# Patient Record
Sex: FEMALE | Race: WHITE | NOT HISPANIC OR LATINO | URBAN - METROPOLITAN AREA
[De-identification: names, ages, dates, MRNs, and addresses within clinical notes are randomized per-mention and may not be internally consistent; named-entity substitution may affect disease eponyms.]

---

## 2017-08-28 ENCOUNTER — OUTPATIENT (OUTPATIENT)
Dept: OUTPATIENT SERVICES | Facility: HOSPITAL | Age: 72
LOS: 1 days | Discharge: HOME | End: 2017-08-28

## 2017-08-31 DIAGNOSIS — Z13.820 ENCOUNTER FOR SCREENING FOR OSTEOPOROSIS: ICD-10-CM

## 2017-08-31 DIAGNOSIS — M89.9 DISORDER OF BONE, UNSPECIFIED: ICD-10-CM

## 2017-08-31 DIAGNOSIS — Z78.0 ASYMPTOMATIC MENOPAUSAL STATE: ICD-10-CM

## 2018-02-20 ENCOUNTER — OUTPATIENT (OUTPATIENT)
Dept: OUTPATIENT SERVICES | Facility: HOSPITAL | Age: 73
LOS: 1 days | Discharge: HOME | End: 2018-02-20

## 2018-02-20 DIAGNOSIS — J18.9 PNEUMONIA, UNSPECIFIED ORGANISM: ICD-10-CM

## 2018-04-23 PROBLEM — Z00.00 ENCOUNTER FOR PREVENTIVE HEALTH EXAMINATION: Status: ACTIVE | Noted: 2018-04-23

## 2018-04-26 ENCOUNTER — APPOINTMENT (OUTPATIENT)
Dept: OTOLARYNGOLOGY | Facility: CLINIC | Age: 73
End: 2018-04-26
Payer: MEDICARE

## 2018-04-26 VITALS — BODY MASS INDEX: 29.56 KG/M2 | HEIGHT: 57 IN | WEIGHT: 137 LBS

## 2018-04-26 DIAGNOSIS — Z83.79 FAMILY HISTORY OF OTHER DISEASES OF THE DIGESTIVE SYSTEM: ICD-10-CM

## 2018-04-26 DIAGNOSIS — Z87.891 PERSONAL HISTORY OF NICOTINE DEPENDENCE: ICD-10-CM

## 2018-04-26 DIAGNOSIS — H61.21 IMPACTED CERUMEN, RIGHT EAR: ICD-10-CM

## 2018-04-26 DIAGNOSIS — I72.9 ANEURYSM OF UNSPECIFIED SITE: ICD-10-CM

## 2018-04-26 DIAGNOSIS — C25.9 MALIGNANT NEOPLASM OF PANCREAS, UNSPECIFIED: ICD-10-CM

## 2018-04-26 DIAGNOSIS — M81.0 AGE-RELATED OSTEOPOROSIS W/OUT CURRENT PATHOLOGICAL FRACTURE: ICD-10-CM

## 2018-04-26 PROCEDURE — 99203 OFFICE O/P NEW LOW 30 MIN: CPT | Mod: 25

## 2018-04-26 PROCEDURE — 69210 REMOVE IMPACTED EAR WAX UNI: CPT

## 2018-04-26 RX ORDER — LISINOPRIL 30 MG/1
TABLET ORAL
Refills: 0 | Status: ACTIVE | COMMUNITY

## 2018-04-26 RX ORDER — RALOXIFENE HYDROCHLORIDE 60 MG/1
TABLET ORAL
Refills: 0 | Status: ACTIVE | COMMUNITY

## 2018-05-07 ENCOUNTER — LABORATORY RESULT (OUTPATIENT)
Age: 73
End: 2018-05-07

## 2018-05-07 ENCOUNTER — OUTPATIENT (OUTPATIENT)
Dept: OUTPATIENT SERVICES | Facility: HOSPITAL | Age: 73
LOS: 1 days | Discharge: HOME | End: 2018-05-07

## 2018-05-07 DIAGNOSIS — R22.1 LOCALIZED SWELLING, MASS AND LUMP, NECK: ICD-10-CM

## 2018-05-15 ENCOUNTER — OUTPATIENT (OUTPATIENT)
Dept: OUTPATIENT SERVICES | Facility: HOSPITAL | Age: 73
LOS: 1 days | Discharge: HOME | End: 2018-05-15

## 2018-05-15 DIAGNOSIS — R22.1 LOCALIZED SWELLING, MASS AND LUMP, NECK: ICD-10-CM

## 2018-05-31 ENCOUNTER — APPOINTMENT (OUTPATIENT)
Dept: OTOLARYNGOLOGY | Facility: CLINIC | Age: 73
End: 2018-05-31
Payer: MEDICARE

## 2018-05-31 VITALS
HEIGHT: 57 IN | DIASTOLIC BLOOD PRESSURE: 75 MMHG | SYSTOLIC BLOOD PRESSURE: 128 MMHG | WEIGHT: 137 LBS | BODY MASS INDEX: 29.56 KG/M2

## 2018-05-31 DIAGNOSIS — R22.1 LOCALIZED SWELLING, MASS AND LUMP, NECK: ICD-10-CM

## 2018-05-31 PROCEDURE — 99213 OFFICE O/P EST LOW 20 MIN: CPT

## 2018-07-18 ENCOUNTER — OUTPATIENT (OUTPATIENT)
Dept: OUTPATIENT SERVICES | Facility: HOSPITAL | Age: 73
LOS: 1 days | Discharge: HOME | End: 2018-07-18

## 2018-07-18 DIAGNOSIS — Z12.31 ENCOUNTER FOR SCREENING MAMMOGRAM FOR MALIGNANT NEOPLASM OF BREAST: ICD-10-CM

## 2019-08-06 ENCOUNTER — OUTPATIENT (OUTPATIENT)
Dept: OUTPATIENT SERVICES | Facility: HOSPITAL | Age: 74
LOS: 1 days | Discharge: HOME | End: 2019-08-06

## 2019-08-06 DIAGNOSIS — Z00.00 ENCOUNTER FOR GENERAL ADULT MEDICAL EXAMINATION WITHOUT ABNORMAL FINDINGS: ICD-10-CM

## 2019-08-06 DIAGNOSIS — R53.81 OTHER MALAISE: ICD-10-CM

## 2019-09-23 ENCOUNTER — OUTPATIENT (OUTPATIENT)
Dept: OUTPATIENT SERVICES | Facility: HOSPITAL | Age: 74
LOS: 1 days | Discharge: HOME | End: 2019-09-23

## 2019-09-24 DIAGNOSIS — Z09 ENCOUNTER FOR FOLLOW-UP EXAMINATION AFTER COMPLETED TREATMENT FOR CONDITIONS OTHER THAN MALIGNANT NEOPLASM: ICD-10-CM

## 2019-09-24 DIAGNOSIS — M81.0 AGE-RELATED OSTEOPOROSIS WITHOUT CURRENT PATHOLOGICAL FRACTURE: ICD-10-CM

## 2019-09-24 DIAGNOSIS — Z78.0 ASYMPTOMATIC MENOPAUSAL STATE: ICD-10-CM

## 2019-10-16 ENCOUNTER — OUTPATIENT (OUTPATIENT)
Dept: OUTPATIENT SERVICES | Facility: HOSPITAL | Age: 74
LOS: 1 days | Discharge: HOME | End: 2019-10-16
Payer: MEDICARE

## 2019-10-16 DIAGNOSIS — Z12.31 ENCOUNTER FOR SCREENING MAMMOGRAM FOR MALIGNANT NEOPLASM OF BREAST: ICD-10-CM

## 2019-10-16 PROCEDURE — 77067 SCR MAMMO BI INCL CAD: CPT | Mod: 26

## 2019-10-16 PROCEDURE — 77063 BREAST TOMOSYNTHESIS BI: CPT | Mod: 26

## 2019-10-22 ENCOUNTER — OUTPATIENT (OUTPATIENT)
Dept: OUTPATIENT SERVICES | Facility: HOSPITAL | Age: 74
LOS: 1 days | Discharge: HOME | End: 2019-10-22

## 2019-10-22 DIAGNOSIS — E55.9 VITAMIN D DEFICIENCY, UNSPECIFIED: ICD-10-CM

## 2019-10-22 DIAGNOSIS — M81.0 AGE-RELATED OSTEOPOROSIS WITHOUT CURRENT PATHOLOGICAL FRACTURE: ICD-10-CM

## 2019-11-01 ENCOUNTER — OUTPATIENT (OUTPATIENT)
Dept: OUTPATIENT SERVICES | Facility: HOSPITAL | Age: 74
LOS: 1 days | Discharge: HOME | End: 2019-11-01
Payer: MEDICARE

## 2019-11-01 DIAGNOSIS — R92.8 OTHER ABNORMAL AND INCONCLUSIVE FINDINGS ON DIAGNOSTIC IMAGING OF BREAST: ICD-10-CM

## 2019-11-01 PROCEDURE — 76642 ULTRASOUND BREAST LIMITED: CPT | Mod: 26,RT

## 2019-11-01 PROCEDURE — G0279: CPT | Mod: 26,RT

## 2019-11-01 PROCEDURE — 77065 DX MAMMO INCL CAD UNI: CPT | Mod: 26,RT

## 2019-11-12 ENCOUNTER — OUTPATIENT (OUTPATIENT)
Dept: OUTPATIENT SERVICES | Facility: HOSPITAL | Age: 74
LOS: 1 days | Discharge: HOME | End: 2019-11-12
Payer: MEDICARE

## 2019-11-12 ENCOUNTER — RESULT REVIEW (OUTPATIENT)
Age: 74
End: 2019-11-12

## 2019-11-12 PROCEDURE — 88305 TISSUE EXAM BY PATHOLOGIST: CPT | Mod: 26

## 2019-11-12 PROCEDURE — 19083 BX BREAST 1ST LESION US IMAG: CPT | Mod: RT

## 2019-11-12 PROCEDURE — 77065 DX MAMMO INCL CAD UNI: CPT | Mod: 26,RT

## 2019-11-13 LAB — SURGICAL PATHOLOGY STUDY: SIGNIFICANT CHANGE UP

## 2019-11-18 DIAGNOSIS — N64.2 ATROPHY OF BREAST: ICD-10-CM

## 2019-11-18 DIAGNOSIS — N63.10 UNSPECIFIED LUMP IN THE RIGHT BREAST, UNSPECIFIED QUADRANT: ICD-10-CM

## 2019-11-18 DIAGNOSIS — N64.1 FAT NECROSIS OF BREAST: ICD-10-CM

## 2019-12-07 ENCOUNTER — OUTPATIENT (OUTPATIENT)
Dept: OUTPATIENT SERVICES | Facility: HOSPITAL | Age: 74
LOS: 1 days | Discharge: HOME | End: 2019-12-07
Payer: MEDICARE

## 2019-12-07 DIAGNOSIS — I71.3 ABDOMINAL AORTIC ANEURYSM, RUPTURED: ICD-10-CM

## 2019-12-07 PROCEDURE — 76775 US EXAM ABDO BACK WALL LIM: CPT | Mod: 26

## 2021-01-01 ENCOUNTER — INPATIENT (INPATIENT)
Facility: HOSPITAL | Age: 76
LOS: 11 days | End: 2021-01-24
Attending: INTERNAL MEDICINE | Admitting: INTERNAL MEDICINE
Payer: MEDICARE

## 2021-01-01 ENCOUNTER — EMERGENCY (EMERGENCY)
Facility: HOSPITAL | Age: 76
LOS: 0 days | Discharge: HOME | End: 2021-01-06
Attending: EMERGENCY MEDICINE | Admitting: EMERGENCY MEDICINE
Payer: MEDICARE

## 2021-01-01 VITALS
DIASTOLIC BLOOD PRESSURE: 75 MMHG | HEART RATE: 82 BPM | SYSTOLIC BLOOD PRESSURE: 135 MMHG | RESPIRATION RATE: 18 BRPM | OXYGEN SATURATION: 95 % | TEMPERATURE: 100 F

## 2021-01-01 VITALS
DIASTOLIC BLOOD PRESSURE: 64 MMHG | SYSTOLIC BLOOD PRESSURE: 111 MMHG | HEART RATE: 78 BPM | TEMPERATURE: 100 F | RESPIRATION RATE: 18 BRPM

## 2021-01-01 VITALS
DIASTOLIC BLOOD PRESSURE: 84 MMHG | TEMPERATURE: 100 F | HEART RATE: 84 BPM | OXYGEN SATURATION: 96 % | SYSTOLIC BLOOD PRESSURE: 180 MMHG | RESPIRATION RATE: 18 BRPM

## 2021-01-01 VITALS — RESPIRATION RATE: 34 BRPM

## 2021-01-01 DIAGNOSIS — Z87.891 PERSONAL HISTORY OF NICOTINE DEPENDENCE: ICD-10-CM

## 2021-01-01 DIAGNOSIS — U07.1 COVID-19: ICD-10-CM

## 2021-01-01 DIAGNOSIS — R51.9 HEADACHE, UNSPECIFIED: ICD-10-CM

## 2021-01-01 DIAGNOSIS — I10 ESSENTIAL (PRIMARY) HYPERTENSION: ICD-10-CM

## 2021-01-01 LAB
ALBUMIN SERPL ELPH-MCNC: 2 G/DL — LOW (ref 3.5–5.2)
ALBUMIN SERPL ELPH-MCNC: 2.2 G/DL — LOW (ref 3.5–5.2)
ALBUMIN SERPL ELPH-MCNC: 2.4 G/DL — LOW (ref 3.5–5.2)
ALBUMIN SERPL ELPH-MCNC: 2.6 G/DL — LOW (ref 3.5–5.2)
ALBUMIN SERPL ELPH-MCNC: 2.7 G/DL — LOW (ref 3.5–5.2)
ALBUMIN SERPL ELPH-MCNC: 2.9 G/DL — LOW (ref 3.5–5.2)
ALBUMIN SERPL ELPH-MCNC: 3 G/DL — LOW (ref 3.5–5.2)
ALBUMIN SERPL ELPH-MCNC: 3.3 G/DL — LOW (ref 3.5–5.2)
ALBUMIN SERPL ELPH-MCNC: 3.5 G/DL — SIGNIFICANT CHANGE UP (ref 3.5–5.2)
ALBUMIN SERPL ELPH-MCNC: 3.5 G/DL — SIGNIFICANT CHANGE UP (ref 3.5–5.2)
ALBUMIN SERPL ELPH-MCNC: 3.8 G/DL — SIGNIFICANT CHANGE UP (ref 3.5–5.2)
ALP SERPL-CCNC: 101 U/L — SIGNIFICANT CHANGE UP (ref 30–115)
ALP SERPL-CCNC: 108 U/L — SIGNIFICANT CHANGE UP (ref 30–115)
ALP SERPL-CCNC: 122 U/L — HIGH (ref 30–115)
ALP SERPL-CCNC: 137 U/L — HIGH (ref 30–115)
ALP SERPL-CCNC: 55 U/L — SIGNIFICANT CHANGE UP (ref 30–115)
ALP SERPL-CCNC: 55 U/L — SIGNIFICANT CHANGE UP (ref 30–115)
ALP SERPL-CCNC: 62 U/L — SIGNIFICANT CHANGE UP (ref 30–115)
ALP SERPL-CCNC: 68 U/L — SIGNIFICANT CHANGE UP (ref 30–115)
ALP SERPL-CCNC: 68 U/L — SIGNIFICANT CHANGE UP (ref 30–115)
ALP SERPL-CCNC: 70 U/L — SIGNIFICANT CHANGE UP (ref 30–115)
ALP SERPL-CCNC: 72 U/L — SIGNIFICANT CHANGE UP (ref 30–115)
ALP SERPL-CCNC: 80 U/L — SIGNIFICANT CHANGE UP (ref 30–115)
ALP SERPL-CCNC: 89 U/L — SIGNIFICANT CHANGE UP (ref 30–115)
ALT FLD-CCNC: 12 U/L — SIGNIFICANT CHANGE UP (ref 0–41)
ALT FLD-CCNC: 13 U/L — SIGNIFICANT CHANGE UP (ref 0–41)
ALT FLD-CCNC: 14 U/L — SIGNIFICANT CHANGE UP (ref 0–41)
ALT FLD-CCNC: 16 U/L — SIGNIFICANT CHANGE UP (ref 0–41)
ALT FLD-CCNC: 17 U/L — SIGNIFICANT CHANGE UP (ref 0–41)
ALT FLD-CCNC: 19 U/L — SIGNIFICANT CHANGE UP (ref 0–41)
ALT FLD-CCNC: 19 U/L — SIGNIFICANT CHANGE UP (ref 0–41)
ALT FLD-CCNC: 20 U/L — SIGNIFICANT CHANGE UP (ref 0–41)
ALT FLD-CCNC: 241 U/L — HIGH (ref 0–41)
ALT FLD-CCNC: 27 U/L — SIGNIFICANT CHANGE UP (ref 0–41)
ALT FLD-CCNC: 53 U/L — HIGH (ref 0–41)
ALT FLD-CCNC: 78 U/L — HIGH (ref 0–41)
ALT FLD-CCNC: 84 U/L — HIGH (ref 0–41)
ANION GAP SERPL CALC-SCNC: 10 MMOL/L — SIGNIFICANT CHANGE UP (ref 7–14)
ANION GAP SERPL CALC-SCNC: 10 MMOL/L — SIGNIFICANT CHANGE UP (ref 7–14)
ANION GAP SERPL CALC-SCNC: 11 MMOL/L — SIGNIFICANT CHANGE UP (ref 7–14)
ANION GAP SERPL CALC-SCNC: 11 MMOL/L — SIGNIFICANT CHANGE UP (ref 7–14)
ANION GAP SERPL CALC-SCNC: 12 MMOL/L — SIGNIFICANT CHANGE UP (ref 7–14)
ANION GAP SERPL CALC-SCNC: 13 MMOL/L — SIGNIFICANT CHANGE UP (ref 7–14)
ANION GAP SERPL CALC-SCNC: 15 MMOL/L — HIGH (ref 7–14)
ANION GAP SERPL CALC-SCNC: 8 MMOL/L — SIGNIFICANT CHANGE UP (ref 7–14)
ANION GAP SERPL CALC-SCNC: 9 MMOL/L — SIGNIFICANT CHANGE UP (ref 7–14)
ANION GAP SERPL CALC-SCNC: 9 MMOL/L — SIGNIFICANT CHANGE UP (ref 7–14)
APPEARANCE UR: CLEAR — SIGNIFICANT CHANGE UP
AST SERPL-CCNC: 157 U/L — HIGH (ref 0–41)
AST SERPL-CCNC: 25 U/L — SIGNIFICANT CHANGE UP (ref 0–41)
AST SERPL-CCNC: 28 U/L — SIGNIFICANT CHANGE UP (ref 0–41)
AST SERPL-CCNC: 32 U/L — SIGNIFICANT CHANGE UP (ref 0–41)
AST SERPL-CCNC: 35 U/L — SIGNIFICANT CHANGE UP (ref 0–41)
AST SERPL-CCNC: 35 U/L — SIGNIFICANT CHANGE UP (ref 0–41)
AST SERPL-CCNC: 38 U/L — SIGNIFICANT CHANGE UP (ref 0–41)
AST SERPL-CCNC: 39 U/L — SIGNIFICANT CHANGE UP (ref 0–41)
AST SERPL-CCNC: 40 U/L — SIGNIFICANT CHANGE UP (ref 0–41)
AST SERPL-CCNC: 40 U/L — SIGNIFICANT CHANGE UP (ref 0–41)
AST SERPL-CCNC: 42 U/L — HIGH (ref 0–41)
AST SERPL-CCNC: 42 U/L — HIGH (ref 0–41)
AST SERPL-CCNC: 52 U/L — HIGH (ref 0–41)
BACTERIA # UR AUTO: NEGATIVE — SIGNIFICANT CHANGE UP
BASE EXCESS BLDA CALC-SCNC: -1.1 MMOL/L — SIGNIFICANT CHANGE UP (ref -2–2)
BASE EXCESS BLDA CALC-SCNC: -3.5 MMOL/L — LOW (ref -2–2)
BASE EXCESS BLDA CALC-SCNC: -6.3 MMOL/L — LOW (ref -2–2)
BASE EXCESS BLDA CALC-SCNC: -6.4 MMOL/L — LOW (ref -2–2)
BASE EXCESS BLDV CALC-SCNC: -2.9 MMOL/L — LOW (ref -2–2)
BASOPHILS # BLD AUTO: 0.01 K/UL — SIGNIFICANT CHANGE UP (ref 0–0.2)
BASOPHILS # BLD AUTO: 0.02 K/UL — SIGNIFICANT CHANGE UP (ref 0–0.2)
BASOPHILS # BLD AUTO: 0.03 K/UL — SIGNIFICANT CHANGE UP (ref 0–0.2)
BASOPHILS # BLD AUTO: 0.1 K/UL — SIGNIFICANT CHANGE UP (ref 0–0.2)
BASOPHILS NFR BLD AUTO: 0.1 % — SIGNIFICANT CHANGE UP (ref 0–1)
BASOPHILS NFR BLD AUTO: 0.2 % — SIGNIFICANT CHANGE UP (ref 0–1)
BASOPHILS NFR BLD AUTO: 0.3 % — SIGNIFICANT CHANGE UP (ref 0–1)
BASOPHILS NFR BLD AUTO: 0.5 % — SIGNIFICANT CHANGE UP (ref 0–1)
BILIRUB SERPL-MCNC: 0.2 MG/DL — SIGNIFICANT CHANGE UP (ref 0.2–1.2)
BILIRUB SERPL-MCNC: 0.2 MG/DL — SIGNIFICANT CHANGE UP (ref 0.2–1.2)
BILIRUB SERPL-MCNC: 0.3 MG/DL — SIGNIFICANT CHANGE UP (ref 0.2–1.2)
BILIRUB SERPL-MCNC: 0.4 MG/DL — SIGNIFICANT CHANGE UP (ref 0.2–1.2)
BILIRUB SERPL-MCNC: <0.2 MG/DL — SIGNIFICANT CHANGE UP (ref 0.2–1.2)
BILIRUB UR-MCNC: NEGATIVE — SIGNIFICANT CHANGE UP
BUN SERPL-MCNC: 119 MG/DL — CRITICAL HIGH (ref 10–20)
BUN SERPL-MCNC: 24 MG/DL — HIGH (ref 10–20)
BUN SERPL-MCNC: 34 MG/DL — HIGH (ref 10–20)
BUN SERPL-MCNC: 35 MG/DL — HIGH (ref 10–20)
BUN SERPL-MCNC: 39 MG/DL — HIGH (ref 10–20)
BUN SERPL-MCNC: 39 MG/DL — HIGH (ref 10–20)
BUN SERPL-MCNC: 43 MG/DL — HIGH (ref 10–20)
BUN SERPL-MCNC: 49 MG/DL — HIGH (ref 10–20)
BUN SERPL-MCNC: 50 MG/DL — HIGH (ref 10–20)
BUN SERPL-MCNC: 62 MG/DL — CRITICAL HIGH (ref 10–20)
BUN SERPL-MCNC: 67 MG/DL — CRITICAL HIGH (ref 10–20)
BUN SERPL-MCNC: 72 MG/DL — CRITICAL HIGH (ref 10–20)
BUN SERPL-MCNC: 81 MG/DL — CRITICAL HIGH (ref 10–20)
BUN SERPL-MCNC: 86 MG/DL — CRITICAL HIGH (ref 10–20)
BUN SERPL-MCNC: 95 MG/DL — CRITICAL HIGH (ref 10–20)
CA-I SERPL-SCNC: 1.22 MMOL/L — SIGNIFICANT CHANGE UP (ref 1.12–1.3)
CALCIUM SERPL-MCNC: 8.1 MG/DL — LOW (ref 8.5–10.1)
CALCIUM SERPL-MCNC: 8.4 MG/DL — LOW (ref 8.5–10.1)
CALCIUM SERPL-MCNC: 8.5 MG/DL — SIGNIFICANT CHANGE UP (ref 8.5–10.1)
CALCIUM SERPL-MCNC: 8.7 MG/DL — SIGNIFICANT CHANGE UP (ref 8.5–10.1)
CALCIUM SERPL-MCNC: 8.8 MG/DL — SIGNIFICANT CHANGE UP (ref 8.5–10.1)
CALCIUM SERPL-MCNC: 8.9 MG/DL — SIGNIFICANT CHANGE UP (ref 8.5–10.1)
CALCIUM SERPL-MCNC: 8.9 MG/DL — SIGNIFICANT CHANGE UP (ref 8.5–10.1)
CALCIUM SERPL-MCNC: 9 MG/DL — SIGNIFICANT CHANGE UP (ref 8.5–10.1)
CALCIUM SERPL-MCNC: 9.1 MG/DL — SIGNIFICANT CHANGE UP (ref 8.5–10.1)
CALCIUM SERPL-MCNC: 9.2 MG/DL — SIGNIFICANT CHANGE UP (ref 8.5–10.1)
CALCIUM SERPL-MCNC: 9.2 MG/DL — SIGNIFICANT CHANGE UP (ref 8.5–10.1)
CHLORIDE SERPL-SCNC: 103 MMOL/L — SIGNIFICANT CHANGE UP (ref 98–110)
CHLORIDE SERPL-SCNC: 104 MMOL/L — SIGNIFICANT CHANGE UP (ref 98–110)
CHLORIDE SERPL-SCNC: 105 MMOL/L — SIGNIFICANT CHANGE UP (ref 98–110)
CHLORIDE SERPL-SCNC: 107 MMOL/L — SIGNIFICANT CHANGE UP (ref 98–110)
CHLORIDE SERPL-SCNC: 108 MMOL/L — SIGNIFICANT CHANGE UP (ref 98–110)
CHLORIDE SERPL-SCNC: 110 MMOL/L — SIGNIFICANT CHANGE UP (ref 98–110)
CHLORIDE SERPL-SCNC: 111 MMOL/L — HIGH (ref 98–110)
CHLORIDE SERPL-SCNC: 113 MMOL/L — HIGH (ref 98–110)
CHLORIDE SERPL-SCNC: 113 MMOL/L — HIGH (ref 98–110)
CHLORIDE SERPL-SCNC: 114 MMOL/L — HIGH (ref 98–110)
CHLORIDE SERPL-SCNC: 118 MMOL/L — HIGH (ref 98–110)
CHLORIDE SERPL-SCNC: 95 MMOL/L — LOW (ref 98–110)
CHLORIDE SERPL-SCNC: 99 MMOL/L — SIGNIFICANT CHANGE UP (ref 98–110)
CO2 SERPL-SCNC: 15 MMOL/L — LOW (ref 17–32)
CO2 SERPL-SCNC: 19 MMOL/L — SIGNIFICANT CHANGE UP (ref 17–32)
CO2 SERPL-SCNC: 21 MMOL/L — SIGNIFICANT CHANGE UP (ref 17–32)
CO2 SERPL-SCNC: 22 MMOL/L — SIGNIFICANT CHANGE UP (ref 17–32)
CO2 SERPL-SCNC: 22 MMOL/L — SIGNIFICANT CHANGE UP (ref 17–32)
CO2 SERPL-SCNC: 23 MMOL/L — SIGNIFICANT CHANGE UP (ref 17–32)
CO2 SERPL-SCNC: 23 MMOL/L — SIGNIFICANT CHANGE UP (ref 17–32)
CO2 SERPL-SCNC: 24 MMOL/L — SIGNIFICANT CHANGE UP (ref 17–32)
CO2 SERPL-SCNC: 28 MMOL/L — SIGNIFICANT CHANGE UP (ref 17–32)
COLOR SPEC: YELLOW — SIGNIFICANT CHANGE UP
CREAT ?TM UR-MCNC: 127 MG/DL — SIGNIFICANT CHANGE UP
CREAT SERPL-MCNC: 1 MG/DL — SIGNIFICANT CHANGE UP (ref 0.7–1.5)
CREAT SERPL-MCNC: 1.1 MG/DL — SIGNIFICANT CHANGE UP (ref 0.7–1.5)
CREAT SERPL-MCNC: 1.2 MG/DL — SIGNIFICANT CHANGE UP (ref 0.7–1.5)
CREAT SERPL-MCNC: 1.3 MG/DL — SIGNIFICANT CHANGE UP (ref 0.7–1.5)
CREAT SERPL-MCNC: 1.3 MG/DL — SIGNIFICANT CHANGE UP (ref 0.7–1.5)
CREAT SERPL-MCNC: 1.5 MG/DL — SIGNIFICANT CHANGE UP (ref 0.7–1.5)
CREAT SERPL-MCNC: 1.5 MG/DL — SIGNIFICANT CHANGE UP (ref 0.7–1.5)
CREAT SERPL-MCNC: 1.8 MG/DL — HIGH (ref 0.7–1.5)
CREAT SERPL-MCNC: 1.9 MG/DL — HIGH (ref 0.7–1.5)
CREAT SERPL-MCNC: 2 MG/DL — HIGH (ref 0.7–1.5)
CREAT SERPL-MCNC: 2.5 MG/DL — HIGH (ref 0.7–1.5)
CRP SERPL-MCNC: 5.03 MG/DL — HIGH (ref 0–0.4)
CRP SERPL-MCNC: 5.82 MG/DL — HIGH (ref 0–0.4)
CRP SERPL-MCNC: 8.34 MG/DL — HIGH (ref 0–0.4)
CRP SERPL-MCNC: 8.42 MG/DL — HIGH (ref 0–0.4)
CRP SERPL-MCNC: 9.21 MG/DL — HIGH (ref 0–0.4)
CRP SERPL-MCNC: 9.75 MG/DL — HIGH (ref 0–0.4)
CULTURE RESULTS: SIGNIFICANT CHANGE UP
CULTURE RESULTS: SIGNIFICANT CHANGE UP
D DIMER BLD IA.RAPID-MCNC: 102 NG/ML DDU — SIGNIFICANT CHANGE UP (ref 0–230)
D DIMER BLD IA.RAPID-MCNC: 274 NG/ML DDU — HIGH (ref 0–230)
D DIMER BLD IA.RAPID-MCNC: 285 NG/ML DDU — HIGH (ref 0–230)
D DIMER BLD IA.RAPID-MCNC: 501 NG/ML DDU — HIGH (ref 0–230)
D DIMER BLD IA.RAPID-MCNC: 564 NG/ML DDU — HIGH (ref 0–230)
D DIMER BLD IA.RAPID-MCNC: 574 NG/ML DDU — HIGH (ref 0–230)
D DIMER BLD IA.RAPID-MCNC: 787 NG/ML DDU — HIGH (ref 0–230)
DIFF PNL FLD: NEGATIVE — SIGNIFICANT CHANGE UP
EOSINOPHIL # BLD AUTO: 0 K/UL — SIGNIFICANT CHANGE UP (ref 0–0.7)
EOSINOPHIL # BLD AUTO: 0.01 K/UL — SIGNIFICANT CHANGE UP (ref 0–0.7)
EOSINOPHIL # BLD AUTO: 0.02 K/UL — SIGNIFICANT CHANGE UP (ref 0–0.7)
EOSINOPHIL NFR BLD AUTO: 0 % — SIGNIFICANT CHANGE UP (ref 0–8)
EOSINOPHIL NFR BLD AUTO: 0.1 % — SIGNIFICANT CHANGE UP (ref 0–8)
EOSINOPHIL NFR BLD AUTO: 0.2 % — SIGNIFICANT CHANGE UP (ref 0–8)
EOSINOPHIL NFR BLD AUTO: 0.2 % — SIGNIFICANT CHANGE UP (ref 0–8)
EPI CELLS # UR: 4 /HPF — SIGNIFICANT CHANGE UP (ref 0–5)
FERRITIN SERPL-MCNC: 836 NG/ML — HIGH (ref 15–150)
FERRITIN SERPL-MCNC: 852 NG/ML — HIGH (ref 15–150)
FERRITIN SERPL-MCNC: 879 NG/ML — HIGH (ref 15–150)
FERRITIN SERPL-MCNC: 984 NG/ML — HIGH (ref 15–150)
GAS PNL BLDA: SIGNIFICANT CHANGE UP
GAS PNL BLDV: 140 MMOL/L — SIGNIFICANT CHANGE UP (ref 136–145)
GAS PNL BLDV: SIGNIFICANT CHANGE UP
GLUCOSE BLDC GLUCOMTR-MCNC: 115 MG/DL — HIGH (ref 70–99)
GLUCOSE BLDC GLUCOMTR-MCNC: 162 MG/DL — HIGH (ref 70–99)
GLUCOSE BLDC GLUCOMTR-MCNC: 163 MG/DL — HIGH (ref 70–99)
GLUCOSE BLDC GLUCOMTR-MCNC: 173 MG/DL — HIGH (ref 70–99)
GLUCOSE BLDC GLUCOMTR-MCNC: 175 MG/DL — HIGH (ref 70–99)
GLUCOSE BLDC GLUCOMTR-MCNC: 182 MG/DL — HIGH (ref 70–99)
GLUCOSE BLDC GLUCOMTR-MCNC: 202 MG/DL — HIGH (ref 70–99)
GLUCOSE BLDC GLUCOMTR-MCNC: 207 MG/DL — HIGH (ref 70–99)
GLUCOSE BLDC GLUCOMTR-MCNC: 210 MG/DL — HIGH (ref 70–99)
GLUCOSE BLDC GLUCOMTR-MCNC: 216 MG/DL — HIGH (ref 70–99)
GLUCOSE BLDC GLUCOMTR-MCNC: 219 MG/DL — HIGH (ref 70–99)
GLUCOSE SERPL-MCNC: 109 MG/DL — HIGH (ref 70–99)
GLUCOSE SERPL-MCNC: 109 MG/DL — HIGH (ref 70–99)
GLUCOSE SERPL-MCNC: 111 MG/DL — HIGH (ref 70–99)
GLUCOSE SERPL-MCNC: 125 MG/DL — HIGH (ref 70–99)
GLUCOSE SERPL-MCNC: 127 MG/DL — HIGH (ref 70–99)
GLUCOSE SERPL-MCNC: 152 MG/DL — HIGH (ref 70–99)
GLUCOSE SERPL-MCNC: 158 MG/DL — HIGH (ref 70–99)
GLUCOSE SERPL-MCNC: 164 MG/DL — HIGH (ref 70–99)
GLUCOSE SERPL-MCNC: 164 MG/DL — HIGH (ref 70–99)
GLUCOSE SERPL-MCNC: 168 MG/DL — HIGH (ref 70–99)
GLUCOSE SERPL-MCNC: 186 MG/DL — HIGH (ref 70–99)
GLUCOSE SERPL-MCNC: 203 MG/DL — HIGH (ref 70–99)
GLUCOSE SERPL-MCNC: 212 MG/DL — HIGH (ref 70–99)
GLUCOSE SERPL-MCNC: 213 MG/DL — HIGH (ref 70–99)
GLUCOSE SERPL-MCNC: 271 MG/DL — HIGH (ref 70–99)
GLUCOSE UR QL: NEGATIVE — SIGNIFICANT CHANGE UP
HCO3 BLDA-SCNC: 19 MMOL/L — LOW (ref 23–27)
HCO3 BLDA-SCNC: 20 MMOL/L — LOW (ref 23–27)
HCO3 BLDA-SCNC: 21 MMOL/L — LOW (ref 23–27)
HCO3 BLDA-SCNC: 25 MMOL/L — SIGNIFICANT CHANGE UP (ref 21–29)
HCO3 BLDV-SCNC: 22 MMOL/L — SIGNIFICANT CHANGE UP (ref 22–29)
HCT VFR BLD CALC: 29.7 % — LOW (ref 37–47)
HCT VFR BLD CALC: 31.4 % — LOW (ref 37–47)
HCT VFR BLD CALC: 32 % — LOW (ref 37–47)
HCT VFR BLD CALC: 32.1 % — LOW (ref 37–47)
HCT VFR BLD CALC: 32.1 % — LOW (ref 37–47)
HCT VFR BLD CALC: 32.4 % — LOW (ref 37–47)
HCT VFR BLD CALC: 32.5 % — LOW (ref 37–47)
HCT VFR BLD CALC: 33 % — LOW (ref 37–47)
HCT VFR BLD CALC: 33.6 % — LOW (ref 37–47)
HCT VFR BLD CALC: 37.1 % — SIGNIFICANT CHANGE UP (ref 37–47)
HCT VFR BLD CALC: 37.8 % — SIGNIFICANT CHANGE UP (ref 37–47)
HCT VFR BLD CALC: 38.6 % — SIGNIFICANT CHANGE UP (ref 37–47)
HCT VFR BLD CALC: 40.5 % — SIGNIFICANT CHANGE UP (ref 37–47)
HCT VFR BLDA CALC: 39.1 % — SIGNIFICANT CHANGE UP (ref 34–44)
HCV AB S/CO SERPL IA: 0.03 COI — SIGNIFICANT CHANGE UP
HCV AB SERPL-IMP: SIGNIFICANT CHANGE UP
HGB BLD CALC-MCNC: 12.8 G/DL — LOW (ref 14–18)
HGB BLD-MCNC: 10 G/DL — LOW (ref 12–16)
HGB BLD-MCNC: 10.2 G/DL — LOW (ref 12–16)
HGB BLD-MCNC: 10.2 G/DL — LOW (ref 12–16)
HGB BLD-MCNC: 10.4 G/DL — LOW (ref 12–16)
HGB BLD-MCNC: 10.6 G/DL — LOW (ref 12–16)
HGB BLD-MCNC: 10.9 G/DL — LOW (ref 12–16)
HGB BLD-MCNC: 12.1 G/DL — SIGNIFICANT CHANGE UP (ref 12–16)
HGB BLD-MCNC: 12.6 G/DL — SIGNIFICANT CHANGE UP (ref 12–16)
HGB BLD-MCNC: 12.7 G/DL — SIGNIFICANT CHANGE UP (ref 12–16)
HGB BLD-MCNC: 13.1 G/DL — SIGNIFICANT CHANGE UP (ref 12–16)
HGB BLD-MCNC: 9.6 G/DL — LOW (ref 12–16)
HOROWITZ INDEX BLDA+IHG-RTO: 100 — SIGNIFICANT CHANGE UP
HOROWITZ INDEX BLDA+IHG-RTO: 100 — SIGNIFICANT CHANGE UP
HYALINE CASTS # UR AUTO: 6 /LPF — SIGNIFICANT CHANGE UP (ref 0–7)
IMM GRANULOCYTES NFR BLD AUTO: 0.3 % — SIGNIFICANT CHANGE UP (ref 0.1–0.3)
IMM GRANULOCYTES NFR BLD AUTO: 0.6 % — HIGH (ref 0.1–0.3)
IMM GRANULOCYTES NFR BLD AUTO: 0.7 % — HIGH (ref 0.1–0.3)
IMM GRANULOCYTES NFR BLD AUTO: 0.7 % — HIGH (ref 0.1–0.3)
IMM GRANULOCYTES NFR BLD AUTO: 0.8 % — HIGH (ref 0.1–0.3)
IMM GRANULOCYTES NFR BLD AUTO: 0.8 % — HIGH (ref 0.1–0.3)
IMM GRANULOCYTES NFR BLD AUTO: 1 % — HIGH (ref 0.1–0.3)
IMM GRANULOCYTES NFR BLD AUTO: 1.7 % — HIGH (ref 0.1–0.3)
IMM GRANULOCYTES NFR BLD AUTO: 1.8 % — HIGH (ref 0.1–0.3)
IMM GRANULOCYTES NFR BLD AUTO: 8.3 % — HIGH (ref 0.1–0.3)
KETONES UR-MCNC: NEGATIVE — SIGNIFICANT CHANGE UP
LACTATE BLDV-MCNC: 1.5 MMOL/L — SIGNIFICANT CHANGE UP (ref 0.5–1.6)
LEGIONELLA AG UR QL: NEGATIVE — SIGNIFICANT CHANGE UP
LEUKOCYTE ESTERASE UR-ACNC: ABNORMAL
LYMPHOCYTES # BLD AUTO: 0.53 K/UL — LOW (ref 1.2–3.4)
LYMPHOCYTES # BLD AUTO: 0.54 K/UL — LOW (ref 1.2–3.4)
LYMPHOCYTES # BLD AUTO: 0.6 K/UL — LOW (ref 1.2–3.4)
LYMPHOCYTES # BLD AUTO: 0.62 K/UL — LOW (ref 1.2–3.4)
LYMPHOCYTES # BLD AUTO: 0.66 K/UL — LOW (ref 1.2–3.4)
LYMPHOCYTES # BLD AUTO: 0.72 K/UL — LOW (ref 1.2–3.4)
LYMPHOCYTES # BLD AUTO: 0.84 K/UL — LOW (ref 1.2–3.4)
LYMPHOCYTES # BLD AUTO: 0.96 K/UL — LOW (ref 1.2–3.4)
LYMPHOCYTES # BLD AUTO: 0.97 K/UL — LOW (ref 1.2–3.4)
LYMPHOCYTES # BLD AUTO: 1 K/UL — LOW (ref 1.2–3.4)
LYMPHOCYTES # BLD AUTO: 16.4 % — LOW (ref 20.5–51.1)
LYMPHOCYTES # BLD AUTO: 19.5 % — LOW (ref 20.5–51.1)
LYMPHOCYTES # BLD AUTO: 4.1 % — LOW (ref 20.5–51.1)
LYMPHOCYTES # BLD AUTO: 4.4 % — LOW (ref 20.5–51.1)
LYMPHOCYTES # BLD AUTO: 4.9 % — LOW (ref 20.5–51.1)
LYMPHOCYTES # BLD AUTO: 5.1 % — LOW (ref 20.5–51.1)
LYMPHOCYTES # BLD AUTO: 5.6 % — LOW (ref 20.5–51.1)
LYMPHOCYTES # BLD AUTO: 6.4 % — LOW (ref 20.5–51.1)
LYMPHOCYTES # BLD AUTO: 6.4 % — LOW (ref 20.5–51.1)
LYMPHOCYTES # BLD AUTO: 8.8 % — LOW (ref 20.5–51.1)
MAGNESIUM SERPL-MCNC: 2 MG/DL — SIGNIFICANT CHANGE UP (ref 1.8–2.4)
MAGNESIUM SERPL-MCNC: 2.3 MG/DL — SIGNIFICANT CHANGE UP (ref 1.8–2.4)
MAGNESIUM SERPL-MCNC: 2.3 MG/DL — SIGNIFICANT CHANGE UP (ref 1.8–2.4)
MAGNESIUM SERPL-MCNC: 2.5 MG/DL — HIGH (ref 1.8–2.4)
MAGNESIUM SERPL-MCNC: 2.6 MG/DL — HIGH (ref 1.8–2.4)
MAGNESIUM SERPL-MCNC: 2.6 MG/DL — HIGH (ref 1.8–2.4)
MAGNESIUM SERPL-MCNC: 2.7 MG/DL — HIGH (ref 1.8–2.4)
MAGNESIUM SERPL-MCNC: 2.7 MG/DL — HIGH (ref 1.8–2.4)
MAGNESIUM SERPL-MCNC: 2.9 MG/DL — HIGH (ref 1.8–2.4)
MCHC RBC-ENTMCNC: 30.9 PG — SIGNIFICANT CHANGE UP (ref 27–31)
MCHC RBC-ENTMCNC: 31.2 G/DL — LOW (ref 32–37)
MCHC RBC-ENTMCNC: 31.2 PG — HIGH (ref 27–31)
MCHC RBC-ENTMCNC: 31.3 G/DL — LOW (ref 32–37)
MCHC RBC-ENTMCNC: 31.3 PG — HIGH (ref 27–31)
MCHC RBC-ENTMCNC: 31.4 G/DL — LOW (ref 32–37)
MCHC RBC-ENTMCNC: 31.4 PG — HIGH (ref 27–31)
MCHC RBC-ENTMCNC: 31.5 PG — HIGH (ref 27–31)
MCHC RBC-ENTMCNC: 31.5 PG — HIGH (ref 27–31)
MCHC RBC-ENTMCNC: 31.6 PG — HIGH (ref 27–31)
MCHC RBC-ENTMCNC: 31.6 PG — HIGH (ref 27–31)
MCHC RBC-ENTMCNC: 31.7 PG — HIGH (ref 27–31)
MCHC RBC-ENTMCNC: 31.8 G/DL — LOW (ref 32–37)
MCHC RBC-ENTMCNC: 31.8 G/DL — LOW (ref 32–37)
MCHC RBC-ENTMCNC: 32 PG — HIGH (ref 27–31)
MCHC RBC-ENTMCNC: 32.1 G/DL — SIGNIFICANT CHANGE UP (ref 32–37)
MCHC RBC-ENTMCNC: 32.1 G/DL — SIGNIFICANT CHANGE UP (ref 32–37)
MCHC RBC-ENTMCNC: 32.1 PG — HIGH (ref 27–31)
MCHC RBC-ENTMCNC: 32.3 G/DL — SIGNIFICANT CHANGE UP (ref 32–37)
MCHC RBC-ENTMCNC: 32.3 G/DL — SIGNIFICANT CHANGE UP (ref 32–37)
MCHC RBC-ENTMCNC: 32.4 G/DL — SIGNIFICANT CHANGE UP (ref 32–37)
MCHC RBC-ENTMCNC: 32.6 G/DL — SIGNIFICANT CHANGE UP (ref 32–37)
MCHC RBC-ENTMCNC: 32.6 G/DL — SIGNIFICANT CHANGE UP (ref 32–37)
MCHC RBC-ENTMCNC: 33.6 G/DL — SIGNIFICANT CHANGE UP (ref 32–37)
MCV RBC AUTO: 100 FL — HIGH (ref 81–99)
MCV RBC AUTO: 100.3 FL — HIGH (ref 81–99)
MCV RBC AUTO: 95.2 FL — SIGNIFICANT CHANGE UP (ref 81–99)
MCV RBC AUTO: 96.7 FL — SIGNIFICANT CHANGE UP (ref 81–99)
MCV RBC AUTO: 97.1 FL — SIGNIFICANT CHANGE UP (ref 81–99)
MCV RBC AUTO: 97.3 FL — SIGNIFICANT CHANGE UP (ref 81–99)
MCV RBC AUTO: 98.1 FL — SIGNIFICANT CHANGE UP (ref 81–99)
MCV RBC AUTO: 98.5 FL — SIGNIFICANT CHANGE UP (ref 81–99)
MCV RBC AUTO: 98.8 FL — SIGNIFICANT CHANGE UP (ref 81–99)
MCV RBC AUTO: 98.8 FL — SIGNIFICANT CHANGE UP (ref 81–99)
MCV RBC AUTO: 99.1 FL — HIGH (ref 81–99)
MCV RBC AUTO: 99.3 FL — HIGH (ref 81–99)
MCV RBC AUTO: 99.4 FL — HIGH (ref 81–99)
MONOCYTES # BLD AUTO: 0.29 K/UL — SIGNIFICANT CHANGE UP (ref 0.1–0.6)
MONOCYTES # BLD AUTO: 0.41 K/UL — SIGNIFICANT CHANGE UP (ref 0.1–0.6)
MONOCYTES # BLD AUTO: 0.49 K/UL — SIGNIFICANT CHANGE UP (ref 0.1–0.6)
MONOCYTES # BLD AUTO: 0.58 K/UL — SIGNIFICANT CHANGE UP (ref 0.1–0.6)
MONOCYTES # BLD AUTO: 0.65 K/UL — HIGH (ref 0.1–0.6)
MONOCYTES # BLD AUTO: 0.65 K/UL — HIGH (ref 0.1–0.6)
MONOCYTES # BLD AUTO: 0.69 K/UL — HIGH (ref 0.1–0.6)
MONOCYTES # BLD AUTO: 0.83 K/UL — HIGH (ref 0.1–0.6)
MONOCYTES # BLD AUTO: 1.02 K/UL — HIGH (ref 0.1–0.6)
MONOCYTES # BLD AUTO: 1.49 K/UL — HIGH (ref 0.1–0.6)
MONOCYTES NFR BLD AUTO: 11.7 % — HIGH (ref 1.7–9.3)
MONOCYTES NFR BLD AUTO: 2.7 % — SIGNIFICANT CHANGE UP (ref 1.7–9.3)
MONOCYTES NFR BLD AUTO: 4.4 % — SIGNIFICANT CHANGE UP (ref 1.7–9.3)
MONOCYTES NFR BLD AUTO: 5.4 % — SIGNIFICANT CHANGE UP (ref 1.7–9.3)
MONOCYTES NFR BLD AUTO: 5.8 % — SIGNIFICANT CHANGE UP (ref 1.7–9.3)
MONOCYTES NFR BLD AUTO: 5.8 % — SIGNIFICANT CHANGE UP (ref 1.7–9.3)
MONOCYTES NFR BLD AUTO: 6.4 % — SIGNIFICANT CHANGE UP (ref 1.7–9.3)
MONOCYTES NFR BLD AUTO: 7.8 % — SIGNIFICANT CHANGE UP (ref 1.7–9.3)
MONOCYTES NFR BLD AUTO: 8 % — SIGNIFICANT CHANGE UP (ref 1.7–9.3)
MONOCYTES NFR BLD AUTO: 9.2 % — SIGNIFICANT CHANGE UP (ref 1.7–9.3)
MRSA PCR RESULT.: NEGATIVE — SIGNIFICANT CHANGE UP
NEUTROPHILS # BLD AUTO: 14.07 K/UL — HIGH (ref 1.4–6.5)
NEUTROPHILS # BLD AUTO: 15.06 K/UL — HIGH (ref 1.4–6.5)
NEUTROPHILS # BLD AUTO: 16.58 K/UL — HIGH (ref 1.4–6.5)
NEUTROPHILS # BLD AUTO: 3.67 K/UL — SIGNIFICANT CHANGE UP (ref 1.4–6.5)
NEUTROPHILS # BLD AUTO: 4.2 K/UL — SIGNIFICANT CHANGE UP (ref 1.4–6.5)
NEUTROPHILS # BLD AUTO: 5.71 K/UL — SIGNIFICANT CHANGE UP (ref 1.4–6.5)
NEUTROPHILS # BLD AUTO: 7.37 K/UL — HIGH (ref 1.4–6.5)
NEUTROPHILS # BLD AUTO: 9.44 K/UL — HIGH (ref 1.4–6.5)
NEUTROPHILS # BLD AUTO: 9.82 K/UL — HIGH (ref 1.4–6.5)
NEUTROPHILS # BLD AUTO: 9.93 K/UL — HIGH (ref 1.4–6.5)
NEUTROPHILS NFR BLD AUTO: 71.2 % — SIGNIFICANT CHANGE UP (ref 42.2–75.2)
NEUTROPHILS NFR BLD AUTO: 71.5 % — SIGNIFICANT CHANGE UP (ref 42.2–75.2)
NEUTROPHILS NFR BLD AUTO: 78.9 % — HIGH (ref 42.2–75.2)
NEUTROPHILS NFR BLD AUTO: 80.7 % — HIGH (ref 42.2–75.2)
NEUTROPHILS NFR BLD AUTO: 86.9 % — HIGH (ref 42.2–75.2)
NEUTROPHILS NFR BLD AUTO: 87 % — HIGH (ref 42.2–75.2)
NEUTROPHILS NFR BLD AUTO: 87.6 % — HIGH (ref 42.2–75.2)
NEUTROPHILS NFR BLD AUTO: 88.2 % — HIGH (ref 42.2–75.2)
NEUTROPHILS NFR BLD AUTO: 88.4 % — HIGH (ref 42.2–75.2)
NEUTROPHILS NFR BLD AUTO: 91.4 % — HIGH (ref 42.2–75.2)
NITRITE UR-MCNC: NEGATIVE — SIGNIFICANT CHANGE UP
NRBC # BLD: 0 /100 WBCS — SIGNIFICANT CHANGE UP (ref 0–0)
NRBC # BLD: 2 /100 WBCS — HIGH (ref 0–0)
NT-PROBNP SERPL-SCNC: 371 PG/ML — HIGH (ref 0–300)
OSMOLALITY UR: 403 MOS/KG — SIGNIFICANT CHANGE UP (ref 50–1200)
OSMOLALITY UR: 600 MOS/KG — SIGNIFICANT CHANGE UP (ref 50–1200)
PCO2 BLDA: 30 MMHG — LOW (ref 38–42)
PCO2 BLDA: 38 MMHG — SIGNIFICANT CHANGE UP (ref 38–42)
PCO2 BLDA: 46 MMHG — HIGH (ref 38–42)
PCO2 BLDA: 50 MMHG — HIGH (ref 38–42)
PCO2 BLDV: 40 MMHG — LOW (ref 41–51)
PH BLDA: 7.26 — LOW (ref 7.38–7.42)
PH BLDA: 7.32 — LOW (ref 7.38–7.42)
PH BLDA: 7.32 — LOW (ref 7.38–7.42)
PH BLDA: 7.42 — SIGNIFICANT CHANGE UP (ref 7.38–7.42)
PH BLDV: 7.36 — SIGNIFICANT CHANGE UP (ref 7.26–7.43)
PH UR: 6 — SIGNIFICANT CHANGE UP (ref 5–8)
PHOSPHATE SERPL-MCNC: 2.4 MG/DL — SIGNIFICANT CHANGE UP (ref 2.1–4.9)
PHOSPHATE SERPL-MCNC: 4.3 MG/DL — SIGNIFICANT CHANGE UP (ref 2.1–4.9)
PLATELET # BLD AUTO: 223 K/UL — SIGNIFICANT CHANGE UP (ref 130–400)
PLATELET # BLD AUTO: 226 K/UL — SIGNIFICANT CHANGE UP (ref 130–400)
PLATELET # BLD AUTO: 229 K/UL — SIGNIFICANT CHANGE UP (ref 130–400)
PLATELET # BLD AUTO: 244 K/UL — SIGNIFICANT CHANGE UP (ref 130–400)
PLATELET # BLD AUTO: 255 K/UL — SIGNIFICANT CHANGE UP (ref 130–400)
PLATELET # BLD AUTO: 257 K/UL — SIGNIFICANT CHANGE UP (ref 130–400)
PLATELET # BLD AUTO: 274 K/UL — SIGNIFICANT CHANGE UP (ref 130–400)
PLATELET # BLD AUTO: 294 K/UL — SIGNIFICANT CHANGE UP (ref 130–400)
PLATELET # BLD AUTO: 294 K/UL — SIGNIFICANT CHANGE UP (ref 130–400)
PLATELET # BLD AUTO: 296 K/UL — SIGNIFICANT CHANGE UP (ref 130–400)
PLATELET # BLD AUTO: 313 K/UL — SIGNIFICANT CHANGE UP (ref 130–400)
PLATELET # BLD AUTO: 333 K/UL — SIGNIFICANT CHANGE UP (ref 130–400)
PLATELET # BLD AUTO: 395 K/UL — SIGNIFICANT CHANGE UP (ref 130–400)
PO2 BLDA: 47 MMHG — LOW (ref 78–95)
PO2 BLDA: 65 MMHG — LOW (ref 78–95)
PO2 BLDA: 65 MMHG — LOW (ref 78–95)
PO2 BLDA: 84 MMHG — SIGNIFICANT CHANGE UP (ref 78–95)
PO2 BLDV: 26 MMHG — SIGNIFICANT CHANGE UP (ref 20–40)
POTASSIUM BLDV-SCNC: 3.9 MMOL/L — SIGNIFICANT CHANGE UP (ref 3.3–5.6)
POTASSIUM SERPL-MCNC: 4 MMOL/L — SIGNIFICANT CHANGE UP (ref 3.5–5)
POTASSIUM SERPL-MCNC: 4 MMOL/L — SIGNIFICANT CHANGE UP (ref 3.5–5)
POTASSIUM SERPL-MCNC: 4.1 MMOL/L — SIGNIFICANT CHANGE UP (ref 3.5–5)
POTASSIUM SERPL-MCNC: 4.1 MMOL/L — SIGNIFICANT CHANGE UP (ref 3.5–5)
POTASSIUM SERPL-MCNC: 4.3 MMOL/L — SIGNIFICANT CHANGE UP (ref 3.5–5)
POTASSIUM SERPL-MCNC: 4.4 MMOL/L — SIGNIFICANT CHANGE UP (ref 3.5–5)
POTASSIUM SERPL-MCNC: 4.4 MMOL/L — SIGNIFICANT CHANGE UP (ref 3.5–5)
POTASSIUM SERPL-MCNC: 4.5 MMOL/L — SIGNIFICANT CHANGE UP (ref 3.5–5)
POTASSIUM SERPL-MCNC: 4.6 MMOL/L — SIGNIFICANT CHANGE UP (ref 3.5–5)
POTASSIUM SERPL-MCNC: 4.6 MMOL/L — SIGNIFICANT CHANGE UP (ref 3.5–5)
POTASSIUM SERPL-MCNC: 5.1 MMOL/L — HIGH (ref 3.5–5)
POTASSIUM SERPL-MCNC: 5.2 MMOL/L — HIGH (ref 3.5–5)
POTASSIUM SERPL-MCNC: 5.3 MMOL/L — HIGH (ref 3.5–5)
POTASSIUM SERPL-MCNC: 5.3 MMOL/L — HIGH (ref 3.5–5)
POTASSIUM SERPL-MCNC: 5.4 MMOL/L — HIGH (ref 3.5–5)
POTASSIUM SERPL-SCNC: 4 MMOL/L — SIGNIFICANT CHANGE UP (ref 3.5–5)
POTASSIUM SERPL-SCNC: 4 MMOL/L — SIGNIFICANT CHANGE UP (ref 3.5–5)
POTASSIUM SERPL-SCNC: 4.1 MMOL/L — SIGNIFICANT CHANGE UP (ref 3.5–5)
POTASSIUM SERPL-SCNC: 4.1 MMOL/L — SIGNIFICANT CHANGE UP (ref 3.5–5)
POTASSIUM SERPL-SCNC: 4.3 MMOL/L — SIGNIFICANT CHANGE UP (ref 3.5–5)
POTASSIUM SERPL-SCNC: 4.4 MMOL/L — SIGNIFICANT CHANGE UP (ref 3.5–5)
POTASSIUM SERPL-SCNC: 4.4 MMOL/L — SIGNIFICANT CHANGE UP (ref 3.5–5)
POTASSIUM SERPL-SCNC: 4.5 MMOL/L — SIGNIFICANT CHANGE UP (ref 3.5–5)
POTASSIUM SERPL-SCNC: 4.6 MMOL/L — SIGNIFICANT CHANGE UP (ref 3.5–5)
POTASSIUM SERPL-SCNC: 4.6 MMOL/L — SIGNIFICANT CHANGE UP (ref 3.5–5)
POTASSIUM SERPL-SCNC: 5.1 MMOL/L — HIGH (ref 3.5–5)
POTASSIUM SERPL-SCNC: 5.2 MMOL/L — HIGH (ref 3.5–5)
POTASSIUM SERPL-SCNC: 5.3 MMOL/L — HIGH (ref 3.5–5)
POTASSIUM SERPL-SCNC: 5.3 MMOL/L — HIGH (ref 3.5–5)
POTASSIUM SERPL-SCNC: 5.4 MMOL/L — HIGH (ref 3.5–5)
POTASSIUM UR-SCNC: 45 MMOL/L — SIGNIFICANT CHANGE UP
PROCALCITONIN SERPL-MCNC: 0.13 NG/ML — HIGH (ref 0.02–0.1)
PROCALCITONIN SERPL-MCNC: 0.19 NG/ML — HIGH (ref 0.02–0.1)
PROCALCITONIN SERPL-MCNC: 0.2 NG/ML — HIGH (ref 0.02–0.1)
PROCALCITONIN SERPL-MCNC: 0.21 NG/ML — HIGH (ref 0.02–0.1)
PROCALCITONIN SERPL-MCNC: 0.21 NG/ML — HIGH (ref 0.02–0.1)
PROCALCITONIN SERPL-MCNC: 0.27 NG/ML — HIGH (ref 0.02–0.1)
PROCALCITONIN SERPL-MCNC: 0.38 NG/ML — HIGH (ref 0.02–0.1)
PROT ?TM UR-MCNC: 59 MG/DLG/24H — SIGNIFICANT CHANGE UP
PROT SERPL-MCNC: 4.4 G/DL — LOW (ref 6–8)
PROT SERPL-MCNC: 4.9 G/DL — LOW (ref 6–8)
PROT SERPL-MCNC: 5.1 G/DL — LOW (ref 6–8)
PROT SERPL-MCNC: 5.2 G/DL — LOW (ref 6–8)
PROT SERPL-MCNC: 5.3 G/DL — LOW (ref 6–8)
PROT SERPL-MCNC: 5.5 G/DL — LOW (ref 6–8)
PROT SERPL-MCNC: 5.8 G/DL — LOW (ref 6–8)
PROT SERPL-MCNC: 6.4 G/DL — SIGNIFICANT CHANGE UP (ref 6–8)
PROT SERPL-MCNC: 6.7 G/DL — SIGNIFICANT CHANGE UP (ref 6–8)
PROT SERPL-MCNC: 6.8 G/DL — SIGNIFICANT CHANGE UP (ref 6–8)
PROT SERPL-MCNC: 6.9 G/DL — SIGNIFICANT CHANGE UP (ref 6–8)
PROT UR-MCNC: ABNORMAL
PROT/CREAT UR-RTO: 0.5 RATIO — HIGH (ref 0–0.2)
RBC # BLD: 2.99 M/UL — LOW (ref 4.2–5.4)
RBC # BLD: 3.16 M/UL — LOW (ref 4.2–5.4)
RBC # BLD: 3.21 M/UL — LOW (ref 4.2–5.4)
RBC # BLD: 3.24 M/UL — LOW (ref 4.2–5.4)
RBC # BLD: 3.28 M/UL — LOW (ref 4.2–5.4)
RBC # BLD: 3.39 M/UL — LOW (ref 4.2–5.4)
RBC # BLD: 3.41 M/UL — LOW (ref 4.2–5.4)
RBC # BLD: 3.78 M/UL — LOW (ref 4.2–5.4)
RBC # BLD: 3.97 M/UL — LOW (ref 4.2–5.4)
RBC # BLD: 3.99 M/UL — LOW (ref 4.2–5.4)
RBC # BLD: 4.17 M/UL — LOW (ref 4.2–5.4)
RBC # FLD: 14.6 % — HIGH (ref 11.5–14.5)
RBC # FLD: 14.6 % — HIGH (ref 11.5–14.5)
RBC # FLD: 14.7 % — HIGH (ref 11.5–14.5)
RBC # FLD: 14.8 % — HIGH (ref 11.5–14.5)
RBC # FLD: 14.9 % — HIGH (ref 11.5–14.5)
RBC # FLD: 15 % — HIGH (ref 11.5–14.5)
RBC # FLD: 15 % — HIGH (ref 11.5–14.5)
RBC # FLD: 15.1 % — HIGH (ref 11.5–14.5)
RBC CASTS # UR COMP ASSIST: 8 /HPF — HIGH (ref 0–4)
S PNEUM AG UR QL: NEGATIVE — SIGNIFICANT CHANGE UP
SAO2 % BLDA: 81 % — LOW (ref 92–96)
SAO2 % BLDA: 92 % — LOW (ref 94–98)
SAO2 % BLDA: 92 % — LOW (ref 94–98)
SAO2 % BLDA: 94 % — SIGNIFICANT CHANGE UP (ref 94–98)
SAO2 % BLDV: 42 % — SIGNIFICANT CHANGE UP
SARS-COV-2 RNA SPEC QL NAA+PROBE: DETECTED
SODIUM SERPL-SCNC: 136 MMOL/L — SIGNIFICANT CHANGE UP (ref 135–146)
SODIUM SERPL-SCNC: 138 MMOL/L — SIGNIFICANT CHANGE UP (ref 135–146)
SODIUM SERPL-SCNC: 139 MMOL/L — SIGNIFICANT CHANGE UP (ref 135–146)
SODIUM SERPL-SCNC: 140 MMOL/L — SIGNIFICANT CHANGE UP (ref 135–146)
SODIUM SERPL-SCNC: 140 MMOL/L — SIGNIFICANT CHANGE UP (ref 135–146)
SODIUM SERPL-SCNC: 141 MMOL/L — SIGNIFICANT CHANGE UP (ref 135–146)
SODIUM SERPL-SCNC: 143 MMOL/L — SIGNIFICANT CHANGE UP (ref 135–146)
SODIUM SERPL-SCNC: 145 MMOL/L — SIGNIFICANT CHANGE UP (ref 135–146)
SODIUM SERPL-SCNC: 145 MMOL/L — SIGNIFICANT CHANGE UP (ref 135–146)
SODIUM SERPL-SCNC: 147 MMOL/L — HIGH (ref 135–146)
SODIUM SERPL-SCNC: 150 MMOL/L — HIGH (ref 135–146)
SODIUM UR-SCNC: 27 MMOL/L — SIGNIFICANT CHANGE UP
SODIUM UR-SCNC: 29 MMOL/L — SIGNIFICANT CHANGE UP
SP GR SPEC: 1.02 — SIGNIFICANT CHANGE UP (ref 1.01–1.03)
SPECIMEN SOURCE: SIGNIFICANT CHANGE UP
SPECIMEN SOURCE: SIGNIFICANT CHANGE UP
TROPONIN T SERPL-MCNC: <0.01 NG/ML — SIGNIFICANT CHANGE UP
UROBILINOGEN FLD QL: SIGNIFICANT CHANGE UP
WBC # BLD: 10.71 K/UL — SIGNIFICANT CHANGE UP (ref 4.8–10.8)
WBC # BLD: 10.86 K/UL — HIGH (ref 4.8–10.8)
WBC # BLD: 10.98 K/UL — HIGH (ref 4.8–10.8)
WBC # BLD: 11.29 K/UL — HIGH (ref 4.8–10.8)
WBC # BLD: 12.34 K/UL — HIGH (ref 4.8–10.8)
WBC # BLD: 16.04 K/UL — HIGH (ref 4.8–10.8)
WBC # BLD: 18.74 K/UL — HIGH (ref 4.8–10.8)
WBC # BLD: 19.09 K/UL — HIGH (ref 4.8–10.8)
WBC # BLD: 5.13 K/UL — SIGNIFICANT CHANGE UP (ref 4.8–10.8)
WBC # BLD: 5.67 K/UL — SIGNIFICANT CHANGE UP (ref 4.8–10.8)
WBC # BLD: 5.9 K/UL — SIGNIFICANT CHANGE UP (ref 4.8–10.8)
WBC # BLD: 7.07 K/UL — SIGNIFICANT CHANGE UP (ref 4.8–10.8)
WBC # BLD: 8.47 K/UL — SIGNIFICANT CHANGE UP (ref 4.8–10.8)
WBC # FLD AUTO: 10.71 K/UL — SIGNIFICANT CHANGE UP (ref 4.8–10.8)
WBC # FLD AUTO: 10.86 K/UL — HIGH (ref 4.8–10.8)
WBC # FLD AUTO: 10.98 K/UL — HIGH (ref 4.8–10.8)
WBC # FLD AUTO: 11.29 K/UL — HIGH (ref 4.8–10.8)
WBC # FLD AUTO: 12.34 K/UL — HIGH (ref 4.8–10.8)
WBC # FLD AUTO: 16.04 K/UL — HIGH (ref 4.8–10.8)
WBC # FLD AUTO: 18.74 K/UL — HIGH (ref 4.8–10.8)
WBC # FLD AUTO: 19.09 K/UL — HIGH (ref 4.8–10.8)
WBC # FLD AUTO: 5.13 K/UL — SIGNIFICANT CHANGE UP (ref 4.8–10.8)
WBC # FLD AUTO: 5.67 K/UL — SIGNIFICANT CHANGE UP (ref 4.8–10.8)
WBC # FLD AUTO: 5.9 K/UL — SIGNIFICANT CHANGE UP (ref 4.8–10.8)
WBC # FLD AUTO: 7.07 K/UL — SIGNIFICANT CHANGE UP (ref 4.8–10.8)
WBC # FLD AUTO: 8.47 K/UL — SIGNIFICANT CHANGE UP (ref 4.8–10.8)
WBC UR QL: 53 /HPF — HIGH (ref 0–5)

## 2021-01-01 PROCEDURE — 99284 EMERGENCY DEPT VISIT MOD MDM: CPT

## 2021-01-01 PROCEDURE — 93970 EXTREMITY STUDY: CPT | Mod: 26

## 2021-01-01 PROCEDURE — 71045 X-RAY EXAM CHEST 1 VIEW: CPT | Mod: 26

## 2021-01-01 PROCEDURE — 93010 ELECTROCARDIOGRAM REPORT: CPT

## 2021-01-01 PROCEDURE — 99291 CRITICAL CARE FIRST HOUR: CPT

## 2021-01-01 PROCEDURE — 99223 1ST HOSP IP/OBS HIGH 75: CPT | Mod: AI

## 2021-01-01 PROCEDURE — 99232 SBSQ HOSP IP/OBS MODERATE 35: CPT

## 2021-01-01 PROCEDURE — 71045 X-RAY EXAM CHEST 1 VIEW: CPT | Mod: 26,77

## 2021-01-01 PROCEDURE — 99233 SBSQ HOSP IP/OBS HIGH 50: CPT

## 2021-01-01 PROCEDURE — 74018 RADEX ABDOMEN 1 VIEW: CPT | Mod: 26

## 2021-01-01 PROCEDURE — 71045 X-RAY EXAM CHEST 1 VIEW: CPT | Mod: 26,76

## 2021-01-01 RX ORDER — LACTULOSE 10 G/15ML
10 SOLUTION ORAL EVERY 8 HOURS
Refills: 0 | Status: DISCONTINUED | OUTPATIENT
Start: 2021-01-01 | End: 2021-01-01

## 2021-01-01 RX ORDER — ATENOLOL 25 MG/1
1 TABLET ORAL
Qty: 0 | Refills: 0 | DISCHARGE

## 2021-01-01 RX ORDER — MEROPENEM 1 G/30ML
1000 INJECTION INTRAVENOUS EVERY 12 HOURS
Refills: 0 | Status: DISCONTINUED | OUTPATIENT
Start: 2021-01-01 | End: 2021-01-01

## 2021-01-01 RX ORDER — SODIUM CHLORIDE 9 MG/ML
1000 INJECTION, SOLUTION INTRAVENOUS ONCE
Refills: 0 | Status: COMPLETED | OUTPATIENT
Start: 2021-01-01 | End: 2021-01-01

## 2021-01-01 RX ORDER — VALSARTAN 80 MG/1
160 TABLET ORAL DAILY
Refills: 0 | Status: DISCONTINUED | OUTPATIENT
Start: 2021-01-01 | End: 2021-01-01

## 2021-01-01 RX ORDER — CHLORHEXIDINE GLUCONATE 213 G/1000ML
15 SOLUTION TOPICAL
Refills: 0 | Status: DISCONTINUED | OUTPATIENT
Start: 2021-01-01 | End: 2021-01-01

## 2021-01-01 RX ORDER — SODIUM CHLORIDE 9 MG/ML
1000 INJECTION, SOLUTION INTRAVENOUS
Refills: 0 | Status: DISCONTINUED | OUTPATIENT
Start: 2021-01-01 | End: 2021-01-01

## 2021-01-01 RX ORDER — ENOXAPARIN SODIUM 100 MG/ML
40 INJECTION SUBCUTANEOUS EVERY 12 HOURS
Refills: 0 | Status: DISCONTINUED | OUTPATIENT
Start: 2021-01-01 | End: 2021-01-01

## 2021-01-01 RX ORDER — FUROSEMIDE 40 MG
80 TABLET ORAL EVERY 12 HOURS
Refills: 0 | Status: DISCONTINUED | OUTPATIENT
Start: 2021-01-01 | End: 2021-01-01

## 2021-01-01 RX ORDER — SODIUM BICARBONATE 1 MEQ/ML
0.23 SYRINGE (ML) INTRAVENOUS
Qty: 150 | Refills: 0 | Status: DISCONTINUED | OUTPATIENT
Start: 2021-01-01 | End: 2021-01-01

## 2021-01-01 RX ORDER — ACETAMINOPHEN 500 MG
650 TABLET ORAL ONCE
Refills: 0 | Status: COMPLETED | OUTPATIENT
Start: 2021-01-01 | End: 2021-01-01

## 2021-01-01 RX ORDER — AMLODIPINE BESYLATE 2.5 MG/1
1 TABLET ORAL
Qty: 0 | Refills: 0 | DISCHARGE

## 2021-01-01 RX ORDER — SODIUM CHLORIDE 9 MG/ML
250 INJECTION INTRAMUSCULAR; INTRAVENOUS; SUBCUTANEOUS ONCE
Refills: 0 | Status: COMPLETED | OUTPATIENT
Start: 2021-01-01 | End: 2021-01-01

## 2021-01-01 RX ORDER — MIDAZOLAM HYDROCHLORIDE 1 MG/ML
0.02 INJECTION, SOLUTION INTRAMUSCULAR; INTRAVENOUS
Qty: 100 | Refills: 0 | Status: DISCONTINUED | OUTPATIENT
Start: 2021-01-01 | End: 2021-01-01

## 2021-01-01 RX ORDER — SODIUM CHLORIDE 9 MG/ML
500 INJECTION, SOLUTION INTRAVENOUS ONCE
Refills: 0 | Status: COMPLETED | OUTPATIENT
Start: 2021-01-01 | End: 2021-01-01

## 2021-01-01 RX ORDER — CISATRACURIUM BESYLATE 2 MG/ML
10 INJECTION INTRAVENOUS ONCE
Refills: 0 | Status: COMPLETED | OUTPATIENT
Start: 2021-01-01 | End: 2021-01-01

## 2021-01-01 RX ORDER — MIDAZOLAM HYDROCHLORIDE 1 MG/ML
2 INJECTION, SOLUTION INTRAMUSCULAR; INTRAVENOUS ONCE
Refills: 0 | Status: DISCONTINUED | OUTPATIENT
Start: 2021-01-01 | End: 2021-01-01

## 2021-01-01 RX ORDER — SODIUM ZIRCONIUM CYCLOSILICATE 10 G/10G
10 POWDER, FOR SUSPENSION ORAL EVERY 8 HOURS
Refills: 0 | Status: COMPLETED | OUTPATIENT
Start: 2021-01-01 | End: 2021-01-01

## 2021-01-01 RX ORDER — HEPARIN SODIUM 5000 [USP'U]/ML
5000 INJECTION INTRAVENOUS; SUBCUTANEOUS EVERY 8 HOURS
Refills: 0 | Status: DISCONTINUED | OUTPATIENT
Start: 2021-01-01 | End: 2021-01-01

## 2021-01-01 RX ORDER — VASOPRESSIN 20 [USP'U]/ML
0 INJECTION INTRAVENOUS
Qty: 50 | Refills: 0 | Status: DISCONTINUED | OUTPATIENT
Start: 2021-01-01 | End: 2021-01-01

## 2021-01-01 RX ORDER — AZITHROMYCIN 500 MG/1
500 TABLET, FILM COATED ORAL EVERY 24 HOURS
Refills: 0 | Status: DISCONTINUED | OUTPATIENT
Start: 2021-01-01 | End: 2021-01-01

## 2021-01-01 RX ORDER — CISATRACURIUM BESYLATE 2 MG/ML
3 INJECTION INTRAVENOUS
Qty: 200 | Refills: 0 | Status: DISCONTINUED | OUTPATIENT
Start: 2021-01-01 | End: 2021-01-01

## 2021-01-01 RX ORDER — KETAMINE HYDROCHLORIDE 100 MG/ML
100 INJECTION INTRAMUSCULAR; INTRAVENOUS ONCE
Refills: 0 | Status: DISCONTINUED | OUTPATIENT
Start: 2021-01-01 | End: 2021-01-01

## 2021-01-01 RX ORDER — CEFTRIAXONE 500 MG/1
2000 INJECTION, POWDER, FOR SOLUTION INTRAMUSCULAR; INTRAVENOUS ONCE
Refills: 0 | Status: COMPLETED | OUTPATIENT
Start: 2021-01-01 | End: 2021-01-01

## 2021-01-01 RX ORDER — FENTANYL CITRATE 50 UG/ML
0.5 INJECTION INTRAVENOUS
Qty: 2500 | Refills: 0 | Status: DISCONTINUED | OUTPATIENT
Start: 2021-01-01 | End: 2021-01-01

## 2021-01-01 RX ORDER — SODIUM ZIRCONIUM CYCLOSILICATE 10 G/10G
10 POWDER, FOR SUSPENSION ORAL EVERY 8 HOURS
Refills: 0 | Status: DISCONTINUED | OUTPATIENT
Start: 2021-01-01 | End: 2021-01-01

## 2021-01-01 RX ORDER — NOREPINEPHRINE BITARTRATE/D5W 8 MG/250ML
0 PLASTIC BAG, INJECTION (ML) INTRAVENOUS
Qty: 32 | Refills: 0 | Status: DISCONTINUED | OUTPATIENT
Start: 2021-01-01 | End: 2021-01-01

## 2021-01-01 RX ORDER — CEFTRIAXONE 500 MG/1
1000 INJECTION, POWDER, FOR SOLUTION INTRAMUSCULAR; INTRAVENOUS ONCE
Refills: 0 | Status: COMPLETED | OUTPATIENT
Start: 2021-01-01 | End: 2021-01-01

## 2021-01-01 RX ORDER — ENOXAPARIN SODIUM 100 MG/ML
40 INJECTION SUBCUTANEOUS DAILY
Refills: 0 | Status: DISCONTINUED | OUTPATIENT
Start: 2021-01-01 | End: 2021-01-01

## 2021-01-01 RX ORDER — PROPOFOL 10 MG/ML
15 INJECTION, EMULSION INTRAVENOUS
Qty: 1000 | Refills: 0 | Status: DISCONTINUED | OUTPATIENT
Start: 2021-01-01 | End: 2021-01-01

## 2021-01-01 RX ORDER — AZITHROMYCIN 500 MG/1
500 TABLET, FILM COATED ORAL ONCE
Refills: 0 | Status: COMPLETED | OUTPATIENT
Start: 2021-01-01 | End: 2021-01-01

## 2021-01-01 RX ORDER — CEFTRIAXONE 500 MG/1
1000 INJECTION, POWDER, FOR SOLUTION INTRAMUSCULAR; INTRAVENOUS EVERY 24 HOURS
Refills: 0 | Status: DISCONTINUED | OUTPATIENT
Start: 2021-01-01 | End: 2021-01-01

## 2021-01-01 RX ORDER — NOREPINEPHRINE BITARTRATE/D5W 8 MG/250ML
0.05 PLASTIC BAG, INJECTION (ML) INTRAVENOUS
Qty: 8 | Refills: 0 | Status: DISCONTINUED | OUTPATIENT
Start: 2021-01-01 | End: 2021-01-01

## 2021-01-01 RX ORDER — VALSARTAN 80 MG/1
1 TABLET ORAL
Qty: 0 | Refills: 0 | DISCHARGE

## 2021-01-01 RX ORDER — FAMOTIDINE 10 MG/ML
40 INJECTION INTRAVENOUS DAILY
Refills: 0 | Status: DISCONTINUED | OUTPATIENT
Start: 2021-01-01 | End: 2021-01-01

## 2021-01-01 RX ORDER — FUROSEMIDE 40 MG
60 TABLET ORAL ONCE
Refills: 0 | Status: COMPLETED | OUTPATIENT
Start: 2021-01-01 | End: 2021-01-01

## 2021-01-01 RX ORDER — LACTULOSE 10 G/15ML
20 SOLUTION ORAL EVERY 6 HOURS
Refills: 0 | Status: DISCONTINUED | OUTPATIENT
Start: 2021-01-01 | End: 2021-01-01

## 2021-01-01 RX ORDER — SODIUM CHLORIDE 9 MG/ML
500 INJECTION INTRAMUSCULAR; INTRAVENOUS; SUBCUTANEOUS ONCE
Refills: 0 | Status: COMPLETED | OUTPATIENT
Start: 2021-01-01 | End: 2021-01-01

## 2021-01-01 RX ORDER — NOREPINEPHRINE BITARTRATE/D5W 8 MG/250ML
0.05 PLASTIC BAG, INJECTION (ML) INTRAVENOUS
Qty: 16 | Refills: 0 | Status: DISCONTINUED | OUTPATIENT
Start: 2021-01-01 | End: 2021-01-01

## 2021-01-01 RX ORDER — MIDAZOLAM HYDROCHLORIDE 1 MG/ML
4 INJECTION, SOLUTION INTRAMUSCULAR; INTRAVENOUS ONCE
Refills: 0 | Status: DISCONTINUED | OUTPATIENT
Start: 2021-01-01 | End: 2021-01-01

## 2021-01-01 RX ORDER — PANTOPRAZOLE SODIUM 20 MG/1
40 TABLET, DELAYED RELEASE ORAL DAILY
Refills: 0 | Status: DISCONTINUED | OUTPATIENT
Start: 2021-01-01 | End: 2021-01-01

## 2021-01-01 RX ORDER — SODIUM CHLORIDE 9 MG/ML
250 INJECTION, SOLUTION INTRAVENOUS ONCE
Refills: 0 | Status: COMPLETED | OUTPATIENT
Start: 2021-01-01 | End: 2021-01-01

## 2021-01-01 RX ORDER — CHLORHEXIDINE GLUCONATE 213 G/1000ML
1 SOLUTION TOPICAL
Refills: 0 | Status: DISCONTINUED | OUTPATIENT
Start: 2021-01-01 | End: 2021-01-01

## 2021-01-01 RX ORDER — RALOXIFENE HYDROCHLORIDE 60 MG/1
60 TABLET, COATED ORAL DAILY
Refills: 0 | Status: DISCONTINUED | OUTPATIENT
Start: 2021-01-01 | End: 2021-01-01

## 2021-01-01 RX ORDER — AMLODIPINE BESYLATE 2.5 MG/1
2.5 TABLET ORAL DAILY
Refills: 0 | Status: DISCONTINUED | OUTPATIENT
Start: 2021-01-01 | End: 2021-01-01

## 2021-01-01 RX ORDER — ENOXAPARIN SODIUM 100 MG/ML
40 INJECTION SUBCUTANEOUS ONCE
Refills: 0 | Status: COMPLETED | OUTPATIENT
Start: 2021-01-01 | End: 2021-01-01

## 2021-01-01 RX ORDER — DEXMEDETOMIDINE HYDROCHLORIDE IN 0.9% SODIUM CHLORIDE 4 UG/ML
0.8 INJECTION INTRAVENOUS
Qty: 400 | Refills: 0 | Status: DISCONTINUED | OUTPATIENT
Start: 2021-01-01 | End: 2021-01-01

## 2021-01-01 RX ORDER — POLYETHYLENE GLYCOL 3350 17 G/17G
17 POWDER, FOR SOLUTION ORAL DAILY
Refills: 0 | Status: DISCONTINUED | OUTPATIENT
Start: 2021-01-01 | End: 2021-01-01

## 2021-01-01 RX ORDER — ATENOLOL 25 MG/1
50 TABLET ORAL DAILY
Refills: 0 | Status: DISCONTINUED | OUTPATIENT
Start: 2021-01-01 | End: 2021-01-01

## 2021-01-01 RX ORDER — FENTANYL CITRATE 50 UG/ML
25 INJECTION INTRAVENOUS ONCE
Refills: 0 | Status: DISCONTINUED | OUTPATIENT
Start: 2021-01-01 | End: 2021-01-01

## 2021-01-01 RX ORDER — SENNA PLUS 8.6 MG/1
2 TABLET ORAL AT BEDTIME
Refills: 0 | Status: DISCONTINUED | OUTPATIENT
Start: 2021-01-01 | End: 2021-01-01

## 2021-01-01 RX ORDER — CEFTRIAXONE 500 MG/1
2000 INJECTION, POWDER, FOR SOLUTION INTRAMUSCULAR; INTRAVENOUS EVERY 24 HOURS
Refills: 0 | Status: DISCONTINUED | OUTPATIENT
Start: 2021-01-01 | End: 2021-01-01

## 2021-01-01 RX ORDER — RALOXIFENE HYDROCHLORIDE 60 MG/1
1 TABLET, COATED ORAL
Qty: 0 | Refills: 0 | DISCHARGE

## 2021-01-01 RX ORDER — DEXMEDETOMIDINE HYDROCHLORIDE IN 0.9% SODIUM CHLORIDE 4 UG/ML
0.2 INJECTION INTRAVENOUS
Qty: 200 | Refills: 0 | Status: DISCONTINUED | OUTPATIENT
Start: 2021-01-01 | End: 2021-01-01

## 2021-01-01 RX ORDER — FUROSEMIDE 40 MG
20 TABLET ORAL ONCE
Refills: 0 | Status: COMPLETED | OUTPATIENT
Start: 2021-01-01 | End: 2021-01-01

## 2021-01-01 RX ORDER — SODIUM BICARBONATE 1 MEQ/ML
0.12 SYRINGE (ML) INTRAVENOUS
Qty: 150 | Refills: 0 | Status: DISCONTINUED | OUTPATIENT
Start: 2021-01-01 | End: 2021-01-01

## 2021-01-01 RX ORDER — FUROSEMIDE 40 MG
60 TABLET ORAL ONCE
Refills: 0 | Status: DISCONTINUED | OUTPATIENT
Start: 2021-01-01 | End: 2021-01-01

## 2021-01-01 RX ORDER — DEXAMETHASONE 0.5 MG/5ML
6 ELIXIR ORAL DAILY
Refills: 0 | Status: DISCONTINUED | OUTPATIENT
Start: 2021-01-01 | End: 2021-01-01

## 2021-01-01 RX ORDER — SODIUM ZIRCONIUM CYCLOSILICATE 10 G/10G
10 POWDER, FOR SUSPENSION ORAL ONCE
Refills: 0 | Status: COMPLETED | OUTPATIENT
Start: 2021-01-01 | End: 2021-01-01

## 2021-01-01 RX ADMIN — SODIUM CHLORIDE 250 MILLILITER(S): 9 INJECTION, SOLUTION INTRAVENOUS at 18:20

## 2021-01-01 RX ADMIN — SODIUM CHLORIDE 75 MILLILITER(S): 9 INJECTION, SOLUTION INTRAVENOUS at 09:49

## 2021-01-01 RX ADMIN — CISATRACURIUM BESYLATE 10 MILLIGRAM(S): 2 INJECTION INTRAVENOUS at 20:00

## 2021-01-01 RX ADMIN — ENOXAPARIN SODIUM 40 MILLIGRAM(S): 100 INJECTION SUBCUTANEOUS at 05:24

## 2021-01-01 RX ADMIN — ATENOLOL 50 MILLIGRAM(S): 25 TABLET ORAL at 11:49

## 2021-01-01 RX ADMIN — CHLORHEXIDINE GLUCONATE 1 APPLICATION(S): 213 SOLUTION TOPICAL at 05:51

## 2021-01-01 RX ADMIN — PANTOPRAZOLE SODIUM 40 MILLIGRAM(S): 20 TABLET, DELAYED RELEASE ORAL at 12:12

## 2021-01-01 RX ADMIN — SODIUM CHLORIDE 500 MILLILITER(S): 9 INJECTION INTRAMUSCULAR; INTRAVENOUS; SUBCUTANEOUS at 15:52

## 2021-01-01 RX ADMIN — CHLORHEXIDINE GLUCONATE 15 MILLILITER(S): 213 SOLUTION TOPICAL at 17:38

## 2021-01-01 RX ADMIN — PANTOPRAZOLE SODIUM 40 MILLIGRAM(S): 20 TABLET, DELAYED RELEASE ORAL at 12:05

## 2021-01-01 RX ADMIN — CISATRACURIUM BESYLATE 10 MILLIGRAM(S): 2 INJECTION INTRAVENOUS at 10:47

## 2021-01-01 RX ADMIN — HEPARIN SODIUM 5000 UNIT(S): 5000 INJECTION INTRAVENOUS; SUBCUTANEOUS at 12:04

## 2021-01-01 RX ADMIN — Medication 6 MILLIGRAM(S): at 05:36

## 2021-01-01 RX ADMIN — CHLORHEXIDINE GLUCONATE 15 MILLILITER(S): 213 SOLUTION TOPICAL at 17:12

## 2021-01-01 RX ADMIN — SODIUM ZIRCONIUM CYCLOSILICATE 10 GRAM(S): 10 POWDER, FOR SUSPENSION ORAL at 19:11

## 2021-01-01 RX ADMIN — Medication 6 MILLIGRAM(S): at 06:52

## 2021-01-01 RX ADMIN — SODIUM CHLORIDE 50 MILLILITER(S): 9 INJECTION, SOLUTION INTRAVENOUS at 05:14

## 2021-01-01 RX ADMIN — Medication 80 MILLIGRAM(S): at 05:57

## 2021-01-01 RX ADMIN — Medication 50 MEQ/KG/HR: at 10:28

## 2021-01-01 RX ADMIN — HEPARIN SODIUM 5000 UNIT(S): 5000 INJECTION INTRAVENOUS; SUBCUTANEOUS at 22:45

## 2021-01-01 RX ADMIN — CHLORHEXIDINE GLUCONATE 15 MILLILITER(S): 213 SOLUTION TOPICAL at 04:12

## 2021-01-01 RX ADMIN — FAMOTIDINE 40 MILLIGRAM(S): 10 INJECTION INTRAVENOUS at 13:15

## 2021-01-01 RX ADMIN — SODIUM CHLORIDE 1000 MILLILITER(S): 9 INJECTION, SOLUTION INTRAVENOUS at 12:01

## 2021-01-01 RX ADMIN — MIDAZOLAM HYDROCHLORIDE 2 MILLIGRAM(S): 1 INJECTION, SOLUTION INTRAMUSCULAR; INTRAVENOUS at 05:14

## 2021-01-01 RX ADMIN — HEPARIN SODIUM 5000 UNIT(S): 5000 INJECTION INTRAVENOUS; SUBCUTANEOUS at 05:37

## 2021-01-01 RX ADMIN — Medication 60 MILLIGRAM(S): at 11:12

## 2021-01-01 RX ADMIN — POLYETHYLENE GLYCOL 3350 17 GRAM(S): 17 POWDER, FOR SOLUTION ORAL at 12:26

## 2021-01-01 RX ADMIN — MIDAZOLAM HYDROCHLORIDE 1.3 MG/KG/HR: 1 INJECTION, SOLUTION INTRAMUSCULAR; INTRAVENOUS at 23:09

## 2021-01-01 RX ADMIN — SENNA PLUS 2 TABLET(S): 8.6 TABLET ORAL at 23:22

## 2021-01-01 RX ADMIN — HEPARIN SODIUM 5000 UNIT(S): 5000 INJECTION INTRAVENOUS; SUBCUTANEOUS at 22:25

## 2021-01-01 RX ADMIN — CHLORHEXIDINE GLUCONATE 15 MILLILITER(S): 213 SOLUTION TOPICAL at 18:02

## 2021-01-01 RX ADMIN — SODIUM CHLORIDE 7500 MILLILITER(S): 9 INJECTION INTRAMUSCULAR; INTRAVENOUS; SUBCUTANEOUS at 18:56

## 2021-01-01 RX ADMIN — POLYETHYLENE GLYCOL 3350 17 GRAM(S): 17 POWDER, FOR SOLUTION ORAL at 12:22

## 2021-01-01 RX ADMIN — CHLORHEXIDINE GLUCONATE 1 APPLICATION(S): 213 SOLUTION TOPICAL at 05:22

## 2021-01-01 RX ADMIN — Medication 6 MILLIGRAM(S): at 05:51

## 2021-01-01 RX ADMIN — HEPARIN SODIUM 5000 UNIT(S): 5000 INJECTION INTRAVENOUS; SUBCUTANEOUS at 22:32

## 2021-01-01 RX ADMIN — Medication 80 MILLIGRAM(S): at 05:33

## 2021-01-01 RX ADMIN — CISATRACURIUM BESYLATE 10 MILLIGRAM(S): 2 INJECTION INTRAVENOUS at 21:15

## 2021-01-01 RX ADMIN — POLYETHYLENE GLYCOL 3350 17 GRAM(S): 17 POWDER, FOR SOLUTION ORAL at 10:28

## 2021-01-01 RX ADMIN — HEPARIN SODIUM 5000 UNIT(S): 5000 INJECTION INTRAVENOUS; SUBCUTANEOUS at 13:18

## 2021-01-01 RX ADMIN — CISATRACURIUM BESYLATE 11.7 MICROGRAM(S)/KG/MIN: 2 INJECTION INTRAVENOUS at 18:05

## 2021-01-01 RX ADMIN — CEFTRIAXONE 100 MILLIGRAM(S): 500 INJECTION, POWDER, FOR SOLUTION INTRAMUSCULAR; INTRAVENOUS at 08:56

## 2021-01-01 RX ADMIN — HEPARIN SODIUM 5000 UNIT(S): 5000 INJECTION INTRAVENOUS; SUBCUTANEOUS at 05:57

## 2021-01-01 RX ADMIN — Medication 6 MILLIGRAM(S): at 05:12

## 2021-01-01 RX ADMIN — KETAMINE HYDROCHLORIDE 100 MILLIGRAM(S): 100 INJECTION INTRAMUSCULAR; INTRAVENOUS at 00:00

## 2021-01-01 RX ADMIN — ENOXAPARIN SODIUM 40 MILLIGRAM(S): 100 INJECTION SUBCUTANEOUS at 12:01

## 2021-01-01 RX ADMIN — LACTULOSE 20 GRAM(S): 10 SOLUTION ORAL at 00:56

## 2021-01-01 RX ADMIN — CHLORHEXIDINE GLUCONATE 15 MILLILITER(S): 213 SOLUTION TOPICAL at 05:35

## 2021-01-01 RX ADMIN — CHLORHEXIDINE GLUCONATE 1 APPLICATION(S): 213 SOLUTION TOPICAL at 05:32

## 2021-01-01 RX ADMIN — CHLORHEXIDINE GLUCONATE 1 APPLICATION(S): 213 SOLUTION TOPICAL at 04:12

## 2021-01-01 RX ADMIN — FENTANYL CITRATE 2.5 MICROGRAM(S)/KG/HR: 50 INJECTION INTRAVENOUS at 04:14

## 2021-01-01 RX ADMIN — CHLORHEXIDINE GLUCONATE 15 MILLILITER(S): 213 SOLUTION TOPICAL at 05:51

## 2021-01-01 RX ADMIN — Medication 6 MILLIGRAM(S): at 05:05

## 2021-01-01 RX ADMIN — CHLORHEXIDINE GLUCONATE 15 MILLILITER(S): 213 SOLUTION TOPICAL at 17:23

## 2021-01-01 RX ADMIN — SENNA PLUS 2 TABLET(S): 8.6 TABLET ORAL at 23:04

## 2021-01-01 RX ADMIN — CHLORHEXIDINE GLUCONATE 15 MILLILITER(S): 213 SOLUTION TOPICAL at 05:55

## 2021-01-01 RX ADMIN — ENOXAPARIN SODIUM 40 MILLIGRAM(S): 100 INJECTION SUBCUTANEOUS at 04:11

## 2021-01-01 RX ADMIN — HEPARIN SODIUM 5000 UNIT(S): 5000 INJECTION INTRAVENOUS; SUBCUTANEOUS at 04:22

## 2021-01-01 RX ADMIN — SODIUM CHLORIDE 500 MILLILITER(S): 9 INJECTION INTRAMUSCULAR; INTRAVENOUS; SUBCUTANEOUS at 23:51

## 2021-01-01 RX ADMIN — SODIUM CHLORIDE 500 MILLILITER(S): 9 INJECTION, SOLUTION INTRAVENOUS at 15:00

## 2021-01-01 RX ADMIN — Medication 6 MILLIGRAM(S): at 04:12

## 2021-01-01 RX ADMIN — SENNA PLUS 2 TABLET(S): 8.6 TABLET ORAL at 22:46

## 2021-01-01 RX ADMIN — SODIUM CHLORIDE 500 MILLILITER(S): 9 INJECTION INTRAMUSCULAR; INTRAVENOUS; SUBCUTANEOUS at 12:50

## 2021-01-01 RX ADMIN — SODIUM CHLORIDE 7500 MILLILITER(S): 9 INJECTION, SOLUTION INTRAVENOUS at 13:30

## 2021-01-01 RX ADMIN — CHLORHEXIDINE GLUCONATE 15 MILLILITER(S): 213 SOLUTION TOPICAL at 05:05

## 2021-01-01 RX ADMIN — Medication 650 MILLIGRAM(S): at 21:04

## 2021-01-01 RX ADMIN — CHLORHEXIDINE GLUCONATE 15 MILLILITER(S): 213 SOLUTION TOPICAL at 05:21

## 2021-01-01 RX ADMIN — LACTULOSE 20 GRAM(S): 10 SOLUTION ORAL at 12:25

## 2021-01-01 RX ADMIN — SODIUM ZIRCONIUM CYCLOSILICATE 10 GRAM(S): 10 POWDER, FOR SUSPENSION ORAL at 10:47

## 2021-01-01 RX ADMIN — ENOXAPARIN SODIUM 40 MILLIGRAM(S): 100 INJECTION SUBCUTANEOUS at 17:05

## 2021-01-01 RX ADMIN — CISATRACURIUM BESYLATE 10 MILLIGRAM(S): 2 INJECTION INTRAVENOUS at 07:31

## 2021-01-01 RX ADMIN — CHLORHEXIDINE GLUCONATE 15 MILLILITER(S): 213 SOLUTION TOPICAL at 17:24

## 2021-01-01 RX ADMIN — SODIUM ZIRCONIUM CYCLOSILICATE 10 GRAM(S): 10 POWDER, FOR SUSPENSION ORAL at 04:04

## 2021-01-01 RX ADMIN — CISATRACURIUM BESYLATE 10 MILLIGRAM(S): 2 INJECTION INTRAVENOUS at 19:30

## 2021-01-01 RX ADMIN — POLYETHYLENE GLYCOL 3350 17 GRAM(S): 17 POWDER, FOR SOLUTION ORAL at 11:47

## 2021-01-01 RX ADMIN — CEFTRIAXONE 100 MILLIGRAM(S): 500 INJECTION, POWDER, FOR SOLUTION INTRAMUSCULAR; INTRAVENOUS at 18:54

## 2021-01-01 RX ADMIN — LACTULOSE 20 GRAM(S): 10 SOLUTION ORAL at 05:57

## 2021-01-01 RX ADMIN — VASOPRESSIN 0.05 UNIT(S)/MIN: 20 INJECTION INTRAVENOUS at 18:04

## 2021-01-01 RX ADMIN — POLYETHYLENE GLYCOL 3350 17 GRAM(S): 17 POWDER, FOR SOLUTION ORAL at 12:38

## 2021-01-01 RX ADMIN — SODIUM ZIRCONIUM CYCLOSILICATE 10 GRAM(S): 10 POWDER, FOR SUSPENSION ORAL at 12:22

## 2021-01-01 RX ADMIN — CHLORHEXIDINE GLUCONATE 15 MILLILITER(S): 213 SOLUTION TOPICAL at 04:01

## 2021-01-01 RX ADMIN — SODIUM CHLORIDE 1000 MILLILITER(S): 9 INJECTION INTRAMUSCULAR; INTRAVENOUS; SUBCUTANEOUS at 12:50

## 2021-01-01 RX ADMIN — ENOXAPARIN SODIUM 40 MILLIGRAM(S): 100 INJECTION SUBCUTANEOUS at 18:01

## 2021-01-01 RX ADMIN — LACTULOSE 20 GRAM(S): 10 SOLUTION ORAL at 11:46

## 2021-01-01 RX ADMIN — Medication 6 MILLIGRAM(S): at 04:17

## 2021-01-01 RX ADMIN — LACTULOSE 20 GRAM(S): 10 SOLUTION ORAL at 05:37

## 2021-01-01 RX ADMIN — CHLORHEXIDINE GLUCONATE 1 APPLICATION(S): 213 SOLUTION TOPICAL at 05:05

## 2021-01-01 RX ADMIN — CHLORHEXIDINE GLUCONATE 1 APPLICATION(S): 213 SOLUTION TOPICAL at 04:18

## 2021-01-01 RX ADMIN — FENTANYL CITRATE 2.5 MICROGRAM(S)/KG/HR: 50 INJECTION INTRAVENOUS at 00:21

## 2021-01-01 RX ADMIN — Medication 6 MILLIGRAM(S): at 05:23

## 2021-01-01 RX ADMIN — MEROPENEM 100 MILLIGRAM(S): 1 INJECTION INTRAVENOUS at 05:12

## 2021-01-01 RX ADMIN — FENTANYL CITRATE 2.5 MICROGRAM(S)/KG/HR: 50 INJECTION INTRAVENOUS at 05:13

## 2021-01-01 RX ADMIN — AMLODIPINE BESYLATE 2.5 MILLIGRAM(S): 2.5 TABLET ORAL at 11:49

## 2021-01-01 RX ADMIN — PANTOPRAZOLE SODIUM 40 MILLIGRAM(S): 20 TABLET, DELAYED RELEASE ORAL at 12:38

## 2021-01-01 RX ADMIN — RALOXIFENE HYDROCHLORIDE 60 MILLIGRAM(S): 60 TABLET, COATED ORAL at 13:51

## 2021-01-01 RX ADMIN — PANTOPRAZOLE SODIUM 40 MILLIGRAM(S): 20 TABLET, DELAYED RELEASE ORAL at 11:47

## 2021-01-01 RX ADMIN — CHLORHEXIDINE GLUCONATE 15 MILLILITER(S): 213 SOLUTION TOPICAL at 16:37

## 2021-01-01 RX ADMIN — CEFTRIAXONE 100 MILLIGRAM(S): 500 INJECTION, POWDER, FOR SOLUTION INTRAMUSCULAR; INTRAVENOUS at 00:01

## 2021-01-01 RX ADMIN — ENOXAPARIN SODIUM 40 MILLIGRAM(S): 100 INJECTION SUBCUTANEOUS at 19:31

## 2021-01-01 RX ADMIN — Medication 2 MILLIGRAM(S): at 22:52

## 2021-01-01 RX ADMIN — HEPARIN SODIUM 5000 UNIT(S): 5000 INJECTION INTRAVENOUS; SUBCUTANEOUS at 23:45

## 2021-01-01 RX ADMIN — LACTULOSE 20 GRAM(S): 10 SOLUTION ORAL at 17:23

## 2021-01-01 RX ADMIN — LACTULOSE 20 GRAM(S): 10 SOLUTION ORAL at 12:12

## 2021-01-01 RX ADMIN — HEPARIN SODIUM 5000 UNIT(S): 5000 INJECTION INTRAVENOUS; SUBCUTANEOUS at 12:26

## 2021-01-01 RX ADMIN — Medication 6 MILLIGRAM(S): at 04:21

## 2021-01-01 RX ADMIN — SENNA PLUS 2 TABLET(S): 8.6 TABLET ORAL at 22:31

## 2021-01-01 RX ADMIN — CHLORHEXIDINE GLUCONATE 15 MILLILITER(S): 213 SOLUTION TOPICAL at 18:01

## 2021-01-01 RX ADMIN — CHLORHEXIDINE GLUCONATE 1 APPLICATION(S): 213 SOLUTION TOPICAL at 04:23

## 2021-01-01 RX ADMIN — RALOXIFENE HYDROCHLORIDE 60 MILLIGRAM(S): 60 TABLET, COATED ORAL at 13:02

## 2021-01-01 RX ADMIN — CHLORHEXIDINE GLUCONATE 1 APPLICATION(S): 213 SOLUTION TOPICAL at 05:58

## 2021-01-01 RX ADMIN — SODIUM CHLORIDE 7500 MILLILITER(S): 9 INJECTION INTRAMUSCULAR; INTRAVENOUS; SUBCUTANEOUS at 16:36

## 2021-01-01 RX ADMIN — POLYETHYLENE GLYCOL 3350 17 GRAM(S): 17 POWDER, FOR SOLUTION ORAL at 12:05

## 2021-01-01 RX ADMIN — Medication 6 MILLIGRAM(S): at 04:02

## 2021-01-01 RX ADMIN — CEFTRIAXONE 100 MILLIGRAM(S): 500 INJECTION, POWDER, FOR SOLUTION INTRAMUSCULAR; INTRAVENOUS at 10:16

## 2021-01-01 RX ADMIN — CEFTRIAXONE 100 MILLIGRAM(S): 500 INJECTION, POWDER, FOR SOLUTION INTRAMUSCULAR; INTRAVENOUS at 09:20

## 2021-01-01 RX ADMIN — LACTULOSE 20 GRAM(S): 10 SOLUTION ORAL at 18:02

## 2021-01-01 RX ADMIN — ENOXAPARIN SODIUM 40 MILLIGRAM(S): 100 INJECTION SUBCUTANEOUS at 04:17

## 2021-01-01 RX ADMIN — HEPARIN SODIUM 5000 UNIT(S): 5000 INJECTION INTRAVENOUS; SUBCUTANEOUS at 23:04

## 2021-01-01 RX ADMIN — MIDAZOLAM HYDROCHLORIDE 4 MILLIGRAM(S): 1 INJECTION, SOLUTION INTRAMUSCULAR; INTRAVENOUS at 04:30

## 2021-01-01 RX ADMIN — LACTULOSE 10 GRAM(S): 10 SOLUTION ORAL at 13:31

## 2021-01-01 RX ADMIN — Medication 80 MILLIGRAM(S): at 17:23

## 2021-01-01 RX ADMIN — Medication 6 MILLIGRAM(S): at 05:56

## 2021-01-01 RX ADMIN — SENNA PLUS 2 TABLET(S): 8.6 TABLET ORAL at 23:46

## 2021-01-01 RX ADMIN — CHLORHEXIDINE GLUCONATE 15 MILLILITER(S): 213 SOLUTION TOPICAL at 04:17

## 2021-01-01 RX ADMIN — LACTULOSE 20 GRAM(S): 10 SOLUTION ORAL at 17:11

## 2021-01-01 RX ADMIN — MIDAZOLAM HYDROCHLORIDE 4 MILLIGRAM(S): 1 INJECTION, SOLUTION INTRAMUSCULAR; INTRAVENOUS at 00:21

## 2021-01-01 RX ADMIN — CHLORHEXIDINE GLUCONATE 15 MILLILITER(S): 213 SOLUTION TOPICAL at 17:26

## 2021-01-01 RX ADMIN — CHLORHEXIDINE GLUCONATE 1 APPLICATION(S): 213 SOLUTION TOPICAL at 04:03

## 2021-01-01 RX ADMIN — CHLORHEXIDINE GLUCONATE 1 APPLICATION(S): 213 SOLUTION TOPICAL at 05:12

## 2021-01-01 RX ADMIN — Medication 2 MILLIGRAM(S): at 14:45

## 2021-01-01 RX ADMIN — ENOXAPARIN SODIUM 40 MILLIGRAM(S): 100 INJECTION SUBCUTANEOUS at 13:51

## 2021-01-01 RX ADMIN — PANTOPRAZOLE SODIUM 40 MILLIGRAM(S): 20 TABLET, DELAYED RELEASE ORAL at 12:25

## 2021-01-01 RX ADMIN — CEFTRIAXONE 100 MILLIGRAM(S): 500 INJECTION, POWDER, FOR SOLUTION INTRAMUSCULAR; INTRAVENOUS at 10:57

## 2021-01-01 RX ADMIN — CEFTRIAXONE 100 MILLIGRAM(S): 500 INJECTION, POWDER, FOR SOLUTION INTRAMUSCULAR; INTRAVENOUS at 09:48

## 2021-01-01 RX ADMIN — Medication 6 MILLIGRAM(S): at 05:57

## 2021-01-01 RX ADMIN — CEFTRIAXONE 100 MILLIGRAM(S): 500 INJECTION, POWDER, FOR SOLUTION INTRAMUSCULAR; INTRAVENOUS at 10:36

## 2021-01-01 RX ADMIN — Medication 80 MILLIGRAM(S): at 10:30

## 2021-01-01 RX ADMIN — LACTULOSE 20 GRAM(S): 10 SOLUTION ORAL at 23:04

## 2021-01-01 RX ADMIN — MIDAZOLAM HYDROCHLORIDE 1.3 MG/KG/HR: 1 INJECTION, SOLUTION INTRAMUSCULAR; INTRAVENOUS at 05:14

## 2021-01-01 RX ADMIN — SODIUM CHLORIDE 250 MILLILITER(S): 9 INJECTION, SOLUTION INTRAVENOUS at 18:54

## 2021-01-01 RX ADMIN — CEFTRIAXONE 100 MILLIGRAM(S): 500 INJECTION, POWDER, FOR SOLUTION INTRAMUSCULAR; INTRAVENOUS at 12:12

## 2021-01-01 RX ADMIN — PANTOPRAZOLE SODIUM 40 MILLIGRAM(S): 20 TABLET, DELAYED RELEASE ORAL at 12:26

## 2021-01-01 RX ADMIN — CISATRACURIUM BESYLATE 10 MILLIGRAM(S): 2 INJECTION INTRAVENOUS at 07:03

## 2021-01-01 RX ADMIN — AZITHROMYCIN 255 MILLIGRAM(S): 500 TABLET, FILM COATED ORAL at 01:00

## 2021-01-01 RX ADMIN — ENOXAPARIN SODIUM 40 MILLIGRAM(S): 100 INJECTION SUBCUTANEOUS at 11:49

## 2021-01-01 RX ADMIN — Medication 3.05 MICROGRAM(S)/KG/MIN: at 18:04

## 2021-01-01 RX ADMIN — FENTANYL CITRATE 3.25 MICROGRAM(S)/KG/HR: 50 INJECTION INTRAVENOUS at 18:05

## 2021-01-01 RX ADMIN — CHLORHEXIDINE GLUCONATE 15 MILLILITER(S): 213 SOLUTION TOPICAL at 17:05

## 2021-01-01 RX ADMIN — SODIUM CHLORIDE 50 MILLILITER(S): 9 INJECTION, SOLUTION INTRAVENOUS at 17:39

## 2021-01-01 RX ADMIN — CEFTRIAXONE 100 MILLIGRAM(S): 500 INJECTION, POWDER, FOR SOLUTION INTRAMUSCULAR; INTRAVENOUS at 10:31

## 2021-01-01 RX ADMIN — CEFTRIAXONE 100 MILLIGRAM(S): 500 INJECTION, POWDER, FOR SOLUTION INTRAMUSCULAR; INTRAVENOUS at 10:09

## 2021-01-01 RX ADMIN — VALSARTAN 160 MILLIGRAM(S): 80 TABLET ORAL at 11:49

## 2021-01-01 RX ADMIN — CISATRACURIUM BESYLATE 11.7 MICROGRAM(S)/KG/MIN: 2 INJECTION INTRAVENOUS at 02:54

## 2021-01-01 RX ADMIN — CHLORHEXIDINE GLUCONATE 15 MILLILITER(S): 213 SOLUTION TOPICAL at 04:21

## 2021-01-01 RX ADMIN — PANTOPRAZOLE SODIUM 40 MILLIGRAM(S): 20 TABLET, DELAYED RELEASE ORAL at 10:57

## 2021-01-01 RX ADMIN — MIDAZOLAM HYDROCHLORIDE 1.3 MG/KG/HR: 1 INJECTION, SOLUTION INTRAMUSCULAR; INTRAVENOUS at 18:05

## 2021-01-01 RX ADMIN — PANTOPRAZOLE SODIUM 40 MILLIGRAM(S): 20 TABLET, DELAYED RELEASE ORAL at 12:22

## 2021-01-01 RX ADMIN — SODIUM CHLORIDE 500 MILLILITER(S): 9 INJECTION, SOLUTION INTRAVENOUS at 16:30

## 2021-01-01 RX ADMIN — Medication 80 MILLIGRAM(S): at 18:03

## 2021-01-01 RX ADMIN — ENOXAPARIN SODIUM 40 MILLIGRAM(S): 100 INJECTION SUBCUTANEOUS at 10:59

## 2021-01-01 RX ADMIN — HEPARIN SODIUM 5000 UNIT(S): 5000 INJECTION INTRAVENOUS; SUBCUTANEOUS at 12:18

## 2021-01-01 RX ADMIN — POLYETHYLENE GLYCOL 3350 17 GRAM(S): 17 POWDER, FOR SOLUTION ORAL at 12:12

## 2021-01-01 RX ADMIN — PROPOFOL 4.5 MICROGRAM(S)/KG/MIN: 10 INJECTION, EMULSION INTRAVENOUS at 00:22

## 2021-01-01 RX ADMIN — Medication 4.69 MICROGRAM(S)/KG/MIN: at 00:29

## 2021-01-01 RX ADMIN — HEPARIN SODIUM 5000 UNIT(S): 5000 INJECTION INTRAVENOUS; SUBCUTANEOUS at 04:02

## 2021-01-01 RX ADMIN — Medication 20 MILLIGRAM(S): at 20:53

## 2021-01-01 RX ADMIN — HEPARIN SODIUM 5000 UNIT(S): 5000 INJECTION INTRAVENOUS; SUBCUTANEOUS at 13:36

## 2021-01-01 RX ADMIN — SODIUM ZIRCONIUM CYCLOSILICATE 10 GRAM(S): 10 POWDER, FOR SUSPENSION ORAL at 12:25

## 2021-01-01 RX ADMIN — HEPARIN SODIUM 5000 UNIT(S): 5000 INJECTION INTRAVENOUS; SUBCUTANEOUS at 12:39

## 2021-01-01 RX ADMIN — HEPARIN SODIUM 5000 UNIT(S): 5000 INJECTION INTRAVENOUS; SUBCUTANEOUS at 05:06

## 2021-01-01 RX ADMIN — Medication 80 MILLIGRAM(S): at 17:11

## 2021-01-06 NOTE — ED PROVIDER NOTE - CLINICAL SUMMARY MEDICAL DECISION MAKING FREE TEXT BOX
76 yo female sent from Urgent Care for evaluation after she tested positive for COVID-19 today.  Patient reports mild headache, decreased appetite, had a few episodes of non-bloody diarrhea yesterday.  Denies any neck pain, blurry vision, no CP, SOB, cough, no N/V or abdominal pain.  Did not take anything for fever today and was not given anything in UC.   Ex-smoker, quit in 2009.    Well-appearing well-nourished, NAD, head AT/NC, PERRL, pink conjunctivae,  mmm, nml oropharynx, nml phonation without drooling or trismus, supple neck without midline spine ttp, nml work of breathing, lungs CTA b/l, speaking full sentences,  equal air entry, RRR, well-perfused extremities, distal pulses intact, abdomen soft, NT/ND, BS present in all quadrants, no midline spine or CVA ttp, no leg edema or unilateral calf swelling, A&Ox3, no focal neuro deficits, nml mood and affect.   CXR appears WNL, patient is saturating well and denies any respiratory complaints.  Stable for d/c home, strict return precautions given.,  She verbalized understanding and is amenable with the plan.

## 2021-01-06 NOTE — ED ADULT TRIAGE NOTE - CHIEF COMPLAINT QUOTE
Pt went to [facility] because she was having a headache, weakness, and poor appetite. Pt tested positive for COVID. Pt direct to hot zone

## 2021-01-06 NOTE — ED PROVIDER NOTE - NS ED ROS FT
Constitutional: + fever. no chills, no recent weight loss, change in appetite or malaise  Eyes: no redness/discharge/pain/vision changes  ENT: no rhinorrhea/ear pain/sore throat  Cardiac: No chest pain, SOB or edema.  Respiratory: No cough or respiratory distress  GI: + diarrhea. No nausea, vomiting abdominal pain.  : No dysuria, frequency, urgency or hematuria  MS: no pain to back or extremities, no loss of ROM, no weakness  Neuro: + headache/dizziness No weakness. No LOC.  Skin: No skin rash.  Endocrine: No history of thyroid disease or diabetes.  Except as documented in the HPI, all other systems are negative.

## 2021-01-06 NOTE — ED ADULT NURSE NOTE - CHIEF COMPLAINT QUOTE
Pt went to [facility] because she was having a headache, weakness, and poor appetite. Pt tested positive for COVID. Pt direct to hot zone

## 2021-01-06 NOTE — ED PROVIDER NOTE - OBJECTIVE STATEMENT
75 year old F with hx of HTN sent in by  for evaluation. Pt sts has had one day of decreased appetite, 3-4 episodes of non bloody watery diarrhea, dizziness (off balance sensation when walking), and mild HA. Pt was seen at  today tested covid + and was sent to ED for further eval. Pt's GUEVARA has resolved after taking Tylenol. Denies any chills, nausea, vomiting, cough, chest pain, sob, abd pain, nausea, vomiting, sick contacts, leg pain/swelling, recent trave. + former smoker.

## 2021-01-06 NOTE — ED ADULT NURSE NOTE - OBJECTIVE STATEMENT
Pt went to [facility] because she was having a headache, weakness, and poor appetite. Pt tested positive for COVID. Pt direct to hot zone

## 2021-01-12 NOTE — ED PROVIDER NOTE - OBJECTIVE STATEMENT
74 y/o F with PMH HTN, COVID+ 1/6/20 presents for "not feeling well" and acting confused as per her . no pain. no palliating/provoking factors. no hx prior.   +fatigue/mild intermittent non-productive cough/diarrhea  no cp/sob.   here is tachypneic and hypoxic to 68% on RA, rapidly improved on NRB and then sustained comfortably on Hi-flow O2.

## 2021-01-12 NOTE — ED ADULT TRIAGE NOTE - CHIEF COMPLAINT QUOTE
Patient states "my  said sometimes I don't make sense." Patient only reports cough and weakness. Currently 68 on RA. 96 on 15L nonrebreather.

## 2021-01-12 NOTE — ED PROVIDER NOTE - PROGRESS NOTE DETAILS
called respiratory to come place pt on high flow O2. on RA pt is @68; on 15lpm NRB 92-93. PALEVY: stable on high flow. approved for step down. discussed with mar.

## 2021-01-12 NOTE — ED PROVIDER NOTE - PHYSICAL EXAMINATION
PHYSICAL EXAM:    GENERAL: Alert, appears stated age, well appearing, non-toxic  SKIN: Warm, pink and dry. MMM.   HEAD: NC  EYE: Normal lids/conjunctiva  ENT: Normal hearing, patent oropharynx   NECK: +supple. No meningismus, or JVD  Pulm: Bilateral BS, increased resp effort, no wheezes, stridor, or retractions. tachypneic and hypoxic to 68% on RA, rapidly improved on NRB and then sustained comfortably on Hi-flow O2.  CV: RRR, no M/R/G, 2+and = radial pulses  Abd: soft, non-tender, non-distended  Mskel: no erythema, cyanosis, edema. no calf tenderness  Neuro: AAOx3, no sensory/motor deficits, CN 2-12 intact. No speech slurring, pronator drift, facial asymmetry. normal finger-to-nose b/l. 5/5 strength throughout. normal gait.

## 2021-01-12 NOTE — ED PROVIDER NOTE - NS ED ROS FT
Review of Systems    Constitutional: (-) fever   Eyes/ENT: (-) vision changes  Cardiovascular: (-) chest pain, (-) syncope (-) palpitations  Respiratory: (-) cough, (-) shortness of breath  Gastrointestinal: (-) vomiting, (-) diarrhea (-)black/bloody stools (-) abdominal pain  Genitourinary:  (-) dysuria   Musculoskeletal: (-) neck pain, (-) back pain, (-) leg pain/swelling  Integumentary: (-) rash, (-) edema  Neurological: (-) headache  Hematologic: (-) easy bruising   Allergic/Immunologic: (-) pruritus

## 2021-01-12 NOTE — ED PROVIDER NOTE - CARE PLAN
Principal Discharge DX:	Hypoxia  Secondary Diagnosis:	COVID-19  Secondary Diagnosis:	Pneumonia  Secondary Diagnosis:	Confusion

## 2021-01-12 NOTE — ED PROVIDER NOTE - WET READ LAUNCH FT
BIBA with c/o right knee pain. Patient notes she was doing house work yesterday and felt a pop to right knee swelling noted pain scale 10/10 denies any trauma There are no Wet Read(s) to document. There is 1 Wet Read(s) to document.

## 2021-01-12 NOTE — ED PROVIDER NOTE - CLINICAL SUMMARY MEDICAL DECISION MAKING FREE TEXT BOX
76 yo F, healthy other than hx of HTN, diagnosed with Covid about 19 days ago, presents to ED for assessment. Initially unclear why as patient denies dyspnea, however on further hx notes that her  stated she was acting confused. Patient was noted to have increased work of breathing and when she was placed on a monitor, found to be hypoxic to 68%, rapidly improved on NRB and then sustained comfortably on Hi-flow O2.     Despite hypoxia, exam without significant respiratory distress or abnormal lung sounds, patient has RRR, soft, NT, ND abdomen, no LE edema or rash. Initially patient was slightly slow to respond however after O2 is much more conversational, has no focal neuro deficits.     Labs unremarkable other than mildly elevated Cr to 1.5 d dimer negative, lactate normal.  CXR suggests covid, plus L sided infiltrate with effusion -- was covered with abx.    Given degree of hypoxia, ICU consulted for step down admission. Patient accepted to step down for continued O2, monitoring, ID eval,

## 2021-01-13 NOTE — H&P ADULT - NSHPLABSRESULTS_GEN_ALL_CORE
12.7   5.90  )-----------( 223      ( 12 Jan 2021 22:08 )             37.8       01-12    138  |  104  |  39<H>  ----------------------------<  125<H>  4.1   |  19  |  1.5    Ca    9.2      12 Jan 2021 22:08    TPro  6.7  /  Alb  3.8  /  TBili  0.2  /  DBili  x   /  AST  39  /  ALT  19  /  AlkPhos  70  01-12            CARDIAC MARKERS ( 12 Jan 2021 22:08 )  x     / <0.01 ng/mL / x     / x     / x            CAPILLARY BLOOD GLUCOSE

## 2021-01-13 NOTE — H&P ADULT - HISTORY OF PRESENT ILLNESS
74 yo F, healthy other than hx of HTN, diagnosed with Covid about 19 days ago, presents to ED for assessment. Initially unclear why as patient denies dyspnea, however on further hx notes that her  stated she was acting confused. Patient was noted to have increased work of breathing and when she was placed on a monitor, found to be hypoxic to 68%, rapidly improved on NRB and then sustained comfortably on Hi-flow O2.  74 yo F, healthy other than hx of HTN, diagnosed with Covid about 19 days ago, presents to ED for assessment. Initially unclear why as patient denies dyspnea, however on further hx notes that her  stated she was acting confused. Patient was noted to have increased work of breathing and when she was placed on a monitor, found to be hypoxic to 68%, rapidly improved on NRB and then sustained comfortably on Hi-flow O2.   This history obtained from ED provider note, patient is AO3 on my encounter, she is having slurring of speech, she does not recall all events, she denied any chest GI symptoms other than diarrhea, no urinary symptoms, no weakness or altered sensation. she could not recall details about being altered, unsure what is the patient baseline.  In the ED was HD stable, on  high flow, CXR showed questionable left sided opacity, was given 500c bolus, azithromycin and ceftriaxone  74 yo F, healthy other than hx of HTN, diagnosed with Covid about 19 days ago, presents to ED for assessment. Initially unclear why as patient denies dyspnea, however on further hx notes that her  stated she was acting confused. Patient was noted to have increased work of breathing and when she was placed on a monitor, found to be hypoxic to 68%, rapidly improved on NRB and then sustained comfortably on Hi-flow O2.   This history obtained from ED provider note, patient is AO3 on my encounter, she is having slurring of speech, she does not recall all events, she denied any chest pain, GI symptoms other than diarrhea, no urinary symptoms, no weakness or altered sensation. she could not recall details about being altered, I am unsure what is the patient baseline.  In the ED was HD stable, on  high flow, CXR showed questionable left sided opacity, was given 500c bolus, azithromycin and ceftriaxone

## 2021-01-13 NOTE — ED ADULT NURSE REASSESSMENT NOTE - NS ED NURSE REASSESS COMMENT FT1
patient awake/alert/oriented. v/s stable on hi-flow O2. no acute distress. iv patent. pt offers no complaints at this time. able to move in the bed independently. safety maintained.

## 2021-01-13 NOTE — CHART NOTE - NSCHARTNOTEFT_GEN_A_CORE
patient seen and examined   patient admitted for Acute hypoxic respiratory failure secondary to COVID-19 infection   cont to monitor vital signs, currently on HFNC  cont with Decadron   cont with ABx untill procal results   ID eval  family updated by the resident

## 2021-01-13 NOTE — H&P ADULT - ATTENDING COMMENTS
**HX and physical limited due to pt being poor historian. Supplemental information obtained from family, house staff, and EMR.     74 YO F with a PMH of HTN and COVID19+ (12/24) who presents to the hospital with a c/o confusion. Associated with generalized weakness. Denies any runny nose, sore throat, rashes, CP, palpitations, LE swelling, N/V/D, ABD pain, and dysuria. - sick contacts. - recent travel. In the ED, Chest X-ray with B/L interstitial infiltrates with left-sided pleural effusion and likely compression atelectais (pending official read). Hypoxic to 68%, placed on NFNC. Isolated and COVID19 swab was positive. Started on IV ABXs (Ceftriaxone/Azithro).    Physical exam shows pt in NAD. VSS, afebrile, not hypoxic on HFNC. A&Ox3. Non-focal neuro exam. Muscle strength/sensation intact. CTA B/L with no W/C/R. RRR, no M/G/R. ABD is soft and non-tender, normoactive BSs. LEs without swelling. No rashes. Labs and radiology as above.     Metabolic encephalopathy due to COVID19 pneumonia + acute hypoxic respiratory failure, no sepsis present on admission. - recent travel. - COVID19 contact. Admit to COVID19 isolation unit. Send Coags, CRP, procal, D-dimer, and LDH. Trend CBC, Ck, and LFTs. Send ferritin and fibrinogen. FU official Chest XR report. IV ABXs (Ceftriaxone/Azithro). IV Steroids. IVFs (LR). APAP PRN. Anti-tussives PRN. Supplemental O2 PRN. Prone pt as tolerated. AC as per [facility] COVID protocol. ID Consult.   -Rule out Left-sided community acquired pneumonia (gram-neg)     DWAYNE, likely pre-renal; Doubt ATN. Send UA, urine lytes, and trend BMP. IVFs (LR). Monitor urinary out-put. Hold nephrotoxic agents.     Mild HAGMA (High Anion Gap Metabolic Acidosis) from uremic acidosis. IVFs (LR). Repeat BMP in the AM.    HX of HTN. Restart home meds. DVT PPX. Inform PCP of pt's admission to hospital. My note supersedes the residents note.

## 2021-01-13 NOTE — CHART NOTE - NSCHARTNOTEFT_GEN_A_CORE
Spoke to [facility] . Updated him on wife's improved mentation.   He reports Pt. was sluggish, not eating well past few days and therefore he brought her to ED. Medications reviewed.    Pt. seen on rounds this am satting low 90s on 60/100 HFNC. Pt. mentating well, asking about when she can go home Pt. is AOx3. Spoke to [facility] . Updated him on wife's improved mentation.   He reports Pt. was sluggish, not eating well past few days and therefore he brought her to ED. Medications reviewed.    Pt. seen on rounds this am satting low 90s on 60/100 HFNC. Pt. mentating well, asking about when she can go home Pt. is AOx3.  If procal negative will d/c abx.   DWAYNE resolved.

## 2021-01-13 NOTE — ED ADULT NURSE REASSESSMENT NOTE - NS ED NURSE REASSESS COMMENT FT1
patient awake/alert/oriented. v/s stable on hi-flow O2. no acute distress. iv patent. patient moves in bed independently. no c/o pain. safety maintained.

## 2021-01-13 NOTE — H&P ADULT - NSHPPHYSICALEXAM_GEN_ALL_CORE
GENERAL: NAD, still lethargic and slow   EYES: EOMI, PERRLA, conjunctiva and sclera clear  ENT: Moist mucous membranes  CHEST/LUNG: bilateral crackles   HEART: Regular rate and rhythm; No murmurs, rubs,  ABDOMEN: Bowel sounds present; Soft, Nontender, Nondistended.   EXTREMITIES:  2+ Peripheral Pulses, brisk capillary refill.   NERVOUS SYSTEM:  Alert & Oriented X3, slurred speech, non focal motor and sensory exam  MSK: FROM all 4 extremities, full and equal strength

## 2021-01-13 NOTE — H&P ADULT - ASSESSMENT
76 yo F, healthy other than hx of HTN, diagnosed with Covid about 19 days ago, presents to ED for assessment. Initially unclear why as patient denies dyspnea    #Acute hypoxic respiratory failure 74 yo F, healthy other than hx of HTN, diagnosed with Covid about 19 days ago, presents to ED for hypoxia and AMS    #Acute hypoxic respiratory failure, possible CAP causing AMS  #Hx of COVID PNA Day 19  -Monitor mental status on better oxygenation  -CXR showed left opacity  - F/u inflammatory markers  - f/u MRSA swab, strep, legionella antigen  -Ceftriaxone and azithromycin  -D-dimer low, but still consider CTA to r/o PE in the AM  -Decadron for now although unknown if their will be benefit after 19 days    #HTN c/w home medication once list obtained   #DWAYNE vs CKD follow up BMP in the AM      Activity as tolerated   Diet DASH/TLC  DVT PPX Lovenox BID  GI ppx PPI  Dispo from home   Code full     Please confirm meds in the AM, was not able to reach the  for further history and meds   76 yo F, healthy other than hx of HTN, diagnosed with Covid about 19 days ago, presents to ED for hypoxia and AMS    #Acute hypoxic respiratory failure, possible CAP causing AMS  #Hx of COVID PNA Day 19  -Monitor mental status on better oxygenation  -CXR showed left opacity  - F/u inflammatory markers  - f/u MRSA swab, strep, legionella antigen  -Ceftriaxone and azithromycin  -D-dimer low, but still consider CTA to r/o PE in the AM  -Decadron for now although unknown if their will be benefit after 19 days    #HTN c/w home medication once list obtained   #DWAYNE vs CKD 3 follow up BMP in the AM, f/u urine lytes, hold any nephrotoxic medications      Activity as tolerated   Diet DASH/TLC  DVT PPX Lovenox BID  GI ppx PPI  Dispo from home   Code full     Please confirm meds in the AM, was not able to reach the  for further history and meds

## 2021-01-14 NOTE — CHART NOTE - NSCHARTNOTEFT_GEN_A_CORE
74 yo F, healthy other than hx of HTN, diagnosed with Covid about 19 days ago, presents to ED for assessment for hypoxia.    #Acute hypoxic respiratory failure secondary to COVID- with episodic AMS  -day 3 since admission has been alternating between HFNC and Bipap  -today dessated on HFNC to low 80s was agitated and given 2mg ativan and replaced on Bipap now satting low 90s  -Pt. rapidly dessats and often takes masks off, 1:1 sit is ordered  -Pt. is full code and low threshold for intubation  -inflammatory markers- cp 8.3 ferritin 836 procal 0.38 pending  -was seen by ID for now only recommending dexa  -today Pt. seen by critical care team and agree for upgrade to ICU level care    #DWAYNE-resolved    #pending repeat blood work and repeat inflammatory markers, LE duplex and repeat xray

## 2021-01-14 NOTE — PROGRESS NOTE ADULT - SUBJECTIVE AND OBJECTIVE BOX
Admit Date: 21 (1d)    Chief Complaint:  Patient is a 75y old  Female who presents with a chief complaint of hypoxia, AMS (2021 02:35)      Past Medical and Surgical History:  PAST MEDICAL & SURGICAL HISTORY:  Hypertension      Current Medications:  MEDICATIONS  (STANDING):  amLODIPine   Tablet 2.5 milliGRAM(s) Oral daily  ATENolol  Tablet 50 milliGRAM(s) Oral daily  chlorhexidine 4% Liquid 1 Application(s) Topical <User Schedule>  dexAMETHasone  Injectable 6 milliGRAM(s) IV Push daily  enoxaparin Injectable 40 milliGRAM(s) SubCutaneous daily  raloxifene 60 milliGRAM(s) Oral daily  valsartan 160 milliGRAM(s) Oral daily    MEDICATIONS  (PRN):    Vital Signs:  T(F): 99.9 (21 @ 08:18), Max: 99.9 (21 @ 08:18)  HR: 65 (21 @ 09:07) (65 - 78)  BP: 137/64 (21 @ 08:18) (111/64 - 158/69)  RR: 20 (21 @ 08:18) (18 - 24)  SpO2: 93% (21 @ 09:07) (68% - 97%)  CAPILLARY BLOOD GLUCOSE      Physical Exam:  General: Not in distress.   HEENT: Moist mucus membranes. PERRLA.  Cardio: Regular rate and rhythm, S1, S2, no murmur, rub, or gallop.  Pulm: Clear to auscultation bilaterally. No wheezing, rales, or rhonchi.  Abdomen: Soft, non-tender, non-distended. Normoactive bowel sounds.  Extremities: No cyanosis or edema bilaterally. No calf tenderness to palpation.  Neuro: A&O x3.    Labs and Imaging:  CBC Full  -  ( 2021 04:30 )  WBC Count : 5.13 K/uL  RBC Count : 3.99 M/uL  Hemoglobin : 12.6 g/dL  Hematocrit : 38.6 %  Platelet Count - Automated : 229 K/uL  Mean Cell Volume : 96.7 fL  Mean Cell Hemoglobin : 31.6 pg  Mean Cell Hemoglobin Concentration : 32.6 g/dL  Auto Neutrophil # : 3.67 K/uL  Auto Lymphocyte # : 1.00 K/uL  Auto Monocyte # : 0.41 K/uL  Auto Eosinophil # : 0.01 K/uL  Auto Basophil # : 0.01 K/uL  Auto Neutrophil % : 71.5 %  Auto Lymphocyte % : 19.5 %  Auto Monocyte % : 8.0 %  Auto Eosinophil % : 0.2 %  Auto Basophil % : 0.2 %    RDW:     BMP: 21 @ 04:30  -- | -- | --   -----------------< --  --  | -- | --  eGFR(AA): --, eGFR (non-AA): --  Ca --, Mg --, P 2.4    LFTs: 21 @ 04:30  TP  6.4  | 3.5 Alb   ---------------  TB  0.2  | --  DB   ---------------  ALT 19  | 42  AST            ^          68  ALK  LFTs: 21 @ 22:08  TP  6.7  | 3.8 Alb   ---------------  TB  0.2  | --  DB   ---------------  ALT 19  | 39  AST            ^          70  ALK          Cardiac Enzymes:    Urinalysis:  Urinalysis Basic - ( 2021 03:56 )    Color: Yellow / Appearance: Clear / S.021 / pH: x  Gluc: x / Ketone: Negative  / Bili: Negative / Urobili: <2 mg/dL   Blood: x / Protein: 30 mg/dL / Nitrite: Negative   Leuk Esterase: Large / RBC: 8 /HPF / WBC 53 /HPF   Sq Epi: x / Non Sq Epi: 4 /HPF / Bacteria: Negative      Cultures:      Home Medications:  Home Medications:  amLODIPine 2.5 mg oral tablet: 1 tab(s) orally once a day (2021 11:16)  atenolol 50 mg oral tablet: 1 tab(s) orally once a day (2021 11:16)  raloxifene 60 mg oral tablet: 1 tab(s) orally once a day (2021 11:17)  valsartan 160 mg oral tablet: 1 tab(s) orally once a day (2021 11:15)

## 2021-01-14 NOTE — CONSULT NOTE ADULT - SUBJECTIVE AND OBJECTIVE BOX
Patient is a 75y old  Female who presents with a chief complaint of hypoxia, AMS (2021 11:03)      HPI:  76 yo F, healthy other than hx of HTN, diagnosed with Covid about 19 days ago, presents to ED for assessment. Initially unclear why as patient denies dyspnea, however on further hx notes that her  stated she was acting confused. Patient was noted to have increased work of breathing and when she was placed on a monitor, found to be hypoxic to 68%, rapidly improved on NRB and then sustained comfortably on Hi-flow O2.   This history obtained from ED provider note, patient is AO3 on my encounter, she is having slurring of speech, she does not recall all events, she denied any chest pain, GI symptoms other than diarrhea, no urinary symptoms, no weakness or altered sensation. she could not recall details about being altered, I am unsure what is the patient baseline.  In the ED was HD stable, on  high flow, CXR showed questionable left sided opacity, was given 500c bolus, azithromycin and ceftriaxone  (2021 02:35)      PAST MEDICAL & SURGICAL HISTORY:  Hypertension        SOCIAL HX:   Smoking    uto                     ETOH      uto                      Other    FAMILY HISTORY:  :  No known cardiovascular family history     Review Of Systems:     CONSTITUTIONAL:   no fever   no chills.  no weight gain   no weight loss    EYES:   no discharge,   no pain  no redness,   no visual changes.    ENT:   Ears: no ear pain and no hearing problems.  Nose: no nasal congestion and no nasal drainage.  Mouth/Throat: no dysphagia,  no hoarseness and no throat pain.  Neck: no lumps, no pain, no stiffness and no swollen glands.     CARDIOVASCULAR:   no chest pain,   no swelling  no palpitaions  no syncope    RESPIRATORY:  + SOB,  no wheezing ,  + respiratory difficulty  no sputum production    GASTROINTESTINAL:   no abdominal pain,   no constipation,   no diarrhea,   no vomiting.    GENITOURINARY:  no dysuria,   no frequency,   no urgency  no hematuria.    MUSCULOSKELETAL:   no back pain,   no musculoskeletal pain,  no weakness.    SKIN:   no jaundice,   no lesions,   no pruritis,   no rashes.    NEURO:   AMS        ALLERGIC/IMMUNOLOGIC:   No active allergic or immunologic issues      Allergies    No Known Allergies    Intolerances          PHYSICAL EXAM    ICU Vital Signs Last 24 Hrs  T(C): 37.2 (2021 12:43), Max: 37.7 (2021 08:18)  T(F): 98.9 (2021 12:43), Max: 99.9 (2021 08:18)  HR: 71 (2021 16:05) (65 - 86)  BP: 135/75 (2021 16:28) (133/64 - 163/77)  RR: 24 (2021 16:28) (20 - 28)  SpO2: 92% (2021 16:28) (60% - 96%)      CONSTITUTIONAL:   Ill appearing.  Well nourished.  NAD    ENT:   + HHFNC  Airway patent,   Mouth with normal mucosa.   No thrush    EYES:   pupils equal,   round and reactive to light.    CARDIAC:   Normal rate,   Regular rhythm.    Heart sounds S1, S2.   No edema      Vascular:   normal systolic impulse  no bruits    RESPIRATORY:   Increased work of breathing  No wheezing   Normal chest expansion  + use of accessory muscles    GASTROINTESTINAL:  Abdomen soft   Non-tender,   No guarding,   + BS    GENITOURINARY  normal genitalia for sex  no edema    MUSCULOSKELETAL:   Range of motion is not limited,  Nno clubbing, cyanosis    NEUROLOGICAL:   Alert, disoriented    SKIN:   Skin normal color for race,   Warm and dry  No evidence of rash.    PSYCHIATRIC:   Normal mood and affect.   No apparent risk to self or others.                  LABS:                          12.6   5.13  )-----------( 229      ( 2021 04:30 )             38.6                                               01-13    140  |  105  |  34<H>  ----------------------------<  109<H>  4.1   |  22  |  1.1    Ca    8.8      2021 04:30  Phos  2.4     -14  Mg     2.0     -    TPro  6.4  /  Alb  3.5  /  TBili  0.2  /  DBili  x   /  AST  42<H>  /  ALT  19  /  AlkPhos  68  01-13                                             Urinalysis Basic - ( 2021 03:56 )    Color: Yellow / Appearance: Clear / S.021 / pH: x  Gluc: x / Ketone: Negative  / Bili: Negative / Urobili: <2 mg/dL   Blood: x / Protein: 30 mg/dL / Nitrite: Negative   Leuk Esterase: Large / RBC: 8 /HPF / WBC 53 /HPF   Sq Epi: x / Non Sq Epi: 4 /HPF / Bacteria: Negative        CARDIAC MARKERS ( 2021 22:08 )  x     / <0.01 ng/mL / x     / x     / x                                                LIVER FUNCTIONS - ( 2021 04:30 )  Alb: 3.5 g/dL / Pro: 6.4 g/dL / ALK PHOS: 68 U/L / ALT: 19 U/L / AST: 42 U/L / GGT: x                                                                                                                                   ABG - ( 2021 09:58 )  pH, Arterial: 7.42  pH, Blood: x     /  pCO2: 30    /  pO2: 65    / HCO3: 20    / Base Excess: -3.5  /  SaO2: 92                  X-Rays reviewed:                                                                                    ECHO    CXR interpreted by me: Bilateral intersitial opacities    MEDICATIONS  (STANDING):  amLODIPine   Tablet 2.5 milliGRAM(s) Oral daily  ATENolol  Tablet 50 milliGRAM(s) Oral daily  chlorhexidine 4% Liquid 1 Application(s) Topical <User Schedule>  dexAMETHasone  Injectable 6 milliGRAM(s) IV Push daily  enoxaparin Injectable 40 milliGRAM(s) SubCutaneous daily  raloxifene 60 milliGRAM(s) Oral daily  valsartan 160 milliGRAM(s) Oral daily    MEDICATIONS  (PRN):         Patient is a 75y old  Female who presents with a chief complaint of hypoxia, AMS (2021 11:03)      HPI:  76 yo F pmhx of HTN, diagnosed with Covid about 19 days ago, presents to ED for assessment. Initially unclear why as patient denies dyspnea, however on further hx notes that her  stated she was acting confused. Patient was noted to have increased work of breathing and when she was placed on a monitor, found to be hypoxic to 68%, rapidly improved on NRB and then sustained comfortably on Hi-flow O2.   This history obtained from ED provider note, patient is AO3 on my encounter, she is having slurring of speech, she does not recall all events, she denied any chest pain, GI symptoms other than diarrhea, no urinary symptoms, no weakness or altered sensation. she could not recall details about being altered, I am unsure what is the patient baseline.  In the ED was HD stable, on  high flow, CXR DONE, was given 500c bolus, azithromycin and ceftriaxone  (2021 02:35) admitted, called to evaluate for MICU increase oxygen requirement 100%      PAST MEDICAL & SURGICAL HISTORY:  Hypertension        SOCIAL HX:   Smoking    uto                     ETOH      uto                      Other    FAMILY HISTORY:  :  No known cardiovascular family history     Review Of Systems:     CONSTITUTIONAL:   fever  weak    EYES:   no discharge,   no pain  no redness,   no visual changes.    ENT:   Ears: no ear pain and no hearing problems.  Nose: no nasal congestion and no nasal drainage.  Mouth/Throat: no dysphagia,  no hoarseness and no throat pain.  Neck: no lumps, no pain, no stiffness and no swollen glands.     CARDIOVASCULAR:   no chest pain,   no swelling  no palpitaions  no syncope    RESPIRATORY:  + SOB,  no wheezing ,  + respiratory difficulty  no sputum production    GASTROINTESTINAL:   no abdominal pain,   no constipation,   no diarrhea,   no vomiting.    GENITOURINARY:  no dysuria,   no frequency,   no urgency  no hematuria.    MUSCULOSKELETAL:   no back pain,   no musculoskeletal pain,  no weakness.    SKIN:   no jaundice,   no lesions,   no pruritis,   no rashes.    NEURO:   AMS        ALLERGIC/IMMUNOLOGIC:   No active allergic or immunologic issues      Allergies    No Known Allergies    Intolerances          PHYSICAL EXAM    ICU Vital Signs Last 24 Hrs  T(C): 37.2 (2021 12:43), Max: 37.7 (2021 08:18)  T(F): 98.9 (2021 12:43), Max: 99.9 (2021 08:18)  HR: 71 (2021 16:05) (65 - 86)  BP: 135/75 (2021 16:28) (133/64 - 163/77)  RR: 24 (2021 16:28) (20 - 28)  SpO2: 92% (2021 16:28) (60% - 96%)      CONSTITUTIONAL:   Ill appearing.  tachypneic     ENT:   + HHFNC  Airway patent,   Mouth with normal mucosa.   No thrush    EYES:   pupils equal,   round and reactive to light.    CARDIAC:     INDERJIT 3.6    RESPIRATORY:   Increased work of breathing  No wheezing   Normal chest expansion  + use of accessory muscles  BL crackles    GASTROINTESTINAL:  Abdomen soft   Non-tender,   No guarding,   + BS    GENITOURINARY  normal genitalia for sex  no edema    MUSCULOSKELETAL:   Range of motion is not limited,  Nno clubbing, cyanosis    NEUROLOGICAL:   Alert, disoriented, non focal    SKIN:   Skin normal color for race,   Warm and dry  No evidence of rash.                    LABS:                          12.6   5.13  )-----------( 229      ( 2021 04:30 )             38.6                                               01-13    140  |  105  |  34<H>  ----------------------------<  109<H>  4.1   |  22  |  1.1    Ca    8.8      2021 04:30  Phos  2.4     01-14  Mg     2.0     01-13    TPro  6.4  /  Alb  3.5  /  TBili  0.2  /  DBili  x   /  AST  42<H>  /  ALT  19  /  AlkPhos  68  01-13                                             Urinalysis Basic - ( 2021 03:56 )    Color: Yellow / Appearance: Clear / S.021 / pH: x  Gluc: x / Ketone: Negative  / Bili: Negative / Urobili: <2 mg/dL   Blood: x / Protein: 30 mg/dL / Nitrite: Negative   Leuk Esterase: Large / RBC: 8 /HPF / WBC 53 /HPF   Sq Epi: x / Non Sq Epi: 4 /HPF / Bacteria: Negative        CARDIAC MARKERS ( 2021 22:08 )  x     / <0.01 ng/mL / x     / x     / x                                                LIVER FUNCTIONS - ( 2021 04:30 )  Alb: 3.5 g/dL / Pro: 6.4 g/dL / ALK PHOS: 68 U/L / ALT: 19 U/L / AST: 42 U/L / GGT: x                                                                                                                                   ABG - ( 2021 09:58 )  pH, Arterial: 7.42  pH, Blood: x     /  pCO2: 30    /  pO2: 65    / HCO3: 20    / Base Excess: -3.5  /  SaO2: 92                  X-Rays reviewed:                                                                                    ECHO    CXR interpreted by me: Bilateral intersitial opacities    MEDICATIONS  (STANDING):  amLODIPine   Tablet 2.5 milliGRAM(s) Oral daily  ATENolol  Tablet 50 milliGRAM(s) Oral daily  chlorhexidine 4% Liquid 1 Application(s) Topical <User Schedule>  dexAMETHasone  Injectable 6 milliGRAM(s) IV Push daily  enoxaparin Injectable 40 milliGRAM(s) SubCutaneous daily  raloxifene 60 milliGRAM(s) Oral daily  valsartan 160 milliGRAM(s) Oral daily    MEDICATIONS  (PRN):

## 2021-01-14 NOTE — CONSULT NOTE ADULT - ATTENDING COMMENTS
patient seen and examined, agree with above, Acute hypoxemic resp failure/ severe covid pneumonia/ steroids/ LE doppler, HHFNC/ BIPAP, MICU, poor prognosis

## 2021-01-14 NOTE — PROGRESS NOTE ADULT - ASSESSMENT
74 yo F, healthy other than hx of HTN, diagnosed with Covid about 19 days ago, presents to ED for hypoxia and AMS      INTERVAL EVENTS: Pt. seen this am on Bipap 14/8 on 100% o2 satting 96%, Pt. weak apearing, ABG- 7.42 po2 65 co2 30 bibarb 20 lactate 1.3 (venous blood?) Pt. was agitated on bipap placed on HFNC satting 92% approved for step down unti by pulm  pending repeat labs, pending ID consult- toci as ferritin above 800  -spoke to and updated  Bryn- Pt, is full code      #Acute hypoxic respiratory failure, possible CAP causing AMS  #Hx of COVID PNA Day 19  -Monitor mental status on better oxygenation-aox3- now on HFNC 60/100 satting 92%  -CXR showed left opacity  - inflammatory markers- cp 8.3 ferritin 836 procal 0.38 pending ID consult for toci  - f/u MRSA swab, strep, legionella antigen  -Decadron for now although unknown if their will be benefit after 19 days, no Remdesivir after 19 days  approved for upgrade to step down    #HTN on valsartan  #DWAYNE -resolved      Activity as tolerated   Diet DASH/TLC  DVT PPX Lovenox BID  GI ppx PPI  Dispo from home   Code full

## 2021-01-14 NOTE — ED ADULT NURSE REASSESSMENT NOTE - NS ED NURSE REASSESS COMMENT FT1
pt O2 sat fell to 68%-78% on bipap. rapid response called @ 2343. Pt ripping off bipap and not tolerating oxygenation well. rapid response team to the bedside and pt intubated immediately.

## 2021-01-14 NOTE — CONSULT NOTE ADULT - ASSESSMENT
IMPRESSION:    Acute hypoxic respiratory failure  COVID PNA  Possible superimposed bacterial infection    PLAN:    CNS: CT head, Avoid CNS depressants    HEENT: Oral care    PULMONARY:  HOB @ 45 degrees. maintain SpO2 > 92% alternate HHFNC and BiPAP is tolerated. low threshold for intubation    CARDIOVASCULAR: I = O. Avoid volume overload.    GI: GI prophylaxis.  Feeding if remains off BIPAP    RENAL:  Follow up lytes.  Correct as needed.    INFECTIOUS DISEASE: Follow up cultures. Ceftriaxone azithromycin for now. re-check procal. repeat inflammatory markers    HEMATOLOGICAL:  DVT prophylaxis.    ENDOCRINE:  Follow up FS.    MUSCULOSKELETAL: Bedrest    ICU monitoring    overall prognosis is poor         IMPRESSION:    Acute hypoxemic respiratory failure worsening  COVID PNA  HTN    PLAN:    CNS: Avoid CNS depressants    HEENT: Oral care    PULMONARY:  HOB @ 45 degrees. maintain SpO2 > 92% alternate HHFNC and BiPAP is tolerated. low threshold for intubation, repeat CXR, Decadron 6 mg IV daily    CARDIOVASCULAR: I = O. Avoid volume overload.    GI: GI prophylaxis.  Feeding if remains off BIPAP    RENAL:  Follow up lytes.  Correct as needed.    INFECTIOUS DISEASE: Follow up cultures.  repeat inflammatory markers, ABX per ID    HEMATOLOGICAL:  DVT prophylaxis. LE doppler    ENDOCRINE:  Follow up FS.    MUSCULOSKELETAL: Bedrest    ICU monitoring    overall prognosis is poor

## 2021-01-14 NOTE — CONSULT NOTE ADULT - SUBJECTIVE AND OBJECTIVE BOX
JUNG ANTOINETTE  75y, Female  Allergy: No Known Allergies      All historical available data reviewed.    HPI:  76 yo F, healthy other than hx of HTN, diagnosed with Covid about 19 days ago, presents to ED for assessment. Initially unclear why as patient denies dyspnea, however on further hx notes that her  stated she was acting confused. Patient was noted to have increased work of breathing and when she was placed on a monitor, found to be hypoxic to 68%, rapidly improved on NRB and then sustained comfortably on Hi-flow O2.   This history obtained from ED provider note, patient is AO3 on my encounter, she is having slurring of speech, she does not recall all events, she denied any chest pain, GI symptoms other than diarrhea, no urinary symptoms, no weakness or altered sensation. she could not recall details about being altered, I am unsure what is the patient baseline.  In the ED was HD stable, on  high flow, CXR showed questionable left sided opacity, was given 500c bolus, azithromycin and ceftriaxone  (2021 02:35)    FAMILY HISTORY:    PAST MEDICAL & SURGICAL HISTORY:  Hypertension          VITALS:  T(F): 99.9, Max: 99.9 (21 @ 08:18)  HR: 65  BP: 137/64  RR: 20Vital Signs Last 24 Hrs  T(C): 37.7 (2021 08:18), Max: 37.7 (2021 08:18)  T(F): 99.9 (2021 08:18), Max: 99.9 (2021 08:18)  HR: 65 (2021 09:07) (65 - 77)  BP: 137/64 (2021 08:18) (137/64 - 153/68)  BP(mean): 98 (2021 15:00) (98 - 98)  RR: 20 (2021 08:18) (20 - 20)  SpO2: 93% (2021 09:07) (91% - 96%)    TESTS & MEASUREMENTS:                        12.6   5.13  )-----------( 229      ( 2021 04:30 )             38.6     01-13    140  |  105  |  34<H>  ----------------------------<  109<H>  4.1   |  22  |  1.1    Ca    8.8      2021 04:30  Phos  2.4     -  Mg     2.0         TPro  6.4  /  Alb  3.5  /  TBili  0.2  /  DBili  x   /  AST  42<H>  /  ALT  19  /  AlkPhos  68      LIVER FUNCTIONS - ( 2021 04:30 )  Alb: 3.5 g/dL / Pro: 6.4 g/dL / ALK PHOS: 68 U/L / ALT: 19 U/L / AST: 42 U/L / GGT: x             Urinalysis Basic - ( 2021 03:56 )    Color: Yellow / Appearance: Clear / S.021 / pH: x  Gluc: x / Ketone: Negative  / Bili: Negative / Urobili: <2 mg/dL   Blood: x / Protein: 30 mg/dL / Nitrite: Negative   Leuk Esterase: Large / RBC: 8 /HPF / WBC 53 /HPF   Sq Epi: x / Non Sq Epi: 4 /HPF / Bacteria: Negative          RADIOLOGY & ADDITIONAL TESTS:  Personal review of radiological diagnostics performed  Echo and EKG results noted when applicable.     MEDICATIONS:  amLODIPine   Tablet 2.5 milliGRAM(s) Oral daily  ATENolol  Tablet 50 milliGRAM(s) Oral daily  chlorhexidine 4% Liquid 1 Application(s) Topical <User Schedule>  dexAMETHasone  Injectable 6 milliGRAM(s) IV Push daily  enoxaparin Injectable 40 milliGRAM(s) SubCutaneous daily  raloxifene 60 milliGRAM(s) Oral daily  valsartan 160 milliGRAM(s) Oral daily      ANTIBIOTICS:

## 2021-01-14 NOTE — PROGRESS NOTE ADULT - CONVERSATION DETAILS
Goals of care discussed with patient in light of comorbidities and present symptoms, patient is full code.     contacted by Resident Physician, states he is in agreement with full code status

## 2021-01-14 NOTE — PROGRESS NOTE ADULT - ATTENDING COMMENTS
Patient seen and examined, currently placed on BIPAP from 60% high flow for desaturation--specific pulse oximetry reading not documented. Pending 11AM labs including proinflammatory markers    #Acute Hypoxemic Respiratory Failure secondary to Severe COVID-19 Pneumonia: Will discuss case with MICU for upgrade to possible step-down unit monitoring, on day 2 of Dexamethasone, awaiting ID recommendations, procalcitonin 0.38    #DWAYNE, likely pre-renal, improving [facility] monitor BMP    #HTN: controlled, remains on valsartan, may continue as creatinine improving     Disposition: Acute Patient seen and examined, patient was placed on BIPAP from 70% high flow for desaturation down to low 80s, patient did not tolerate BIPAP mask placed back onto 70% high flow saturating 91%, pulmonary fellow present by bedside.     #Acute Hypoxemic Respiratory Failure secondary to Severe COVID-19 Pneumonia: Will upgrade to step-down unit monitoring, on day 2 of Dexamethasone, ID recommendations noted, procalcitonin 0.38    #DWAYNE, likely pre-renal, improving baseline creatinine around 1.0 from [facility] labs, monitor BMP    #HTN: controlled, remains on valsartan, may continue as creatinine improving     Disposition: Acute

## 2021-01-14 NOTE — PROGRESS NOTE ADULT - SUBJECTIVE AND OBJECTIVE BOX
SUBJECTIVE:    Patient is a 75y old Female who presents with a chief complaint of hypoxia, AMS (2021 09:59)    Currently admitted to medicine with the primary diagnosis of Hypoxia       Today is hospital day 1d. This morning she is resting comfortably in bed and reports no new issues or overnight events.     INTERVAL EVENTS: Pt. seen this am on Bipap  on 100% o2 satting 96%, Pt. weak apearing, ABG- 7.42 po2 65 co2 30 bibarb 20 lactate 1.3 (venous blood?) Pt. was agitated on bipap placed on HFNC satting 92% approved for step down unti by pulm  pending repeat labs, pending ID consult- toci as ferritin above 800  -spoke to and updated  Bryn- Pt, is full code    PAST MEDICAL & SURGICAL HISTORY  Hypertension        ALLERGIES:  No Known Allergies    MEDICATIONS:  STANDING MEDICATIONS  amLODIPine   Tablet 2.5 milliGRAM(s) Oral daily  ATENolol  Tablet 50 milliGRAM(s) Oral daily  chlorhexidine 4% Liquid 1 Application(s) Topical <User Schedule>  dexAMETHasone  Injectable 6 milliGRAM(s) IV Push daily  enoxaparin Injectable 40 milliGRAM(s) SubCutaneous daily  raloxifene 60 milliGRAM(s) Oral daily  valsartan 160 milliGRAM(s) Oral daily    PRN MEDICATIONS    VITALS:   T(F): 99.9  HR: 65  BP: 137/64  RR: 20  SpO2: 93%    LABS:                        12.6   5.13  )-----------( 229      ( 2021 04:30 )             38.6     01-13    140  |  105  |  34<H>  ----------------------------<  109<H>  4.1   |  22  |  1.1    Ca    8.8      2021 04:30  Phos  2.4     01-14  Mg     2.0     -13    TPro  6.4  /  Alb  3.5  /  TBili  0.2  /  DBili  x   /  AST  42<H>  /  ALT  19  /  AlkPhos  68  01-13      Urinalysis Basic - ( 2021 03:56 )    Color: Yellow / Appearance: Clear / S.021 / pH: x  Gluc: x / Ketone: Negative  / Bili: Negative / Urobili: <2 mg/dL   Blood: x / Protein: 30 mg/dL / Nitrite: Negative   Leuk Esterase: Large / RBC: 8 /HPF / WBC 53 /HPF   Sq Epi: x / Non Sq Epi: 4 /HPF / Bacteria: Negative            CARDIAC MARKERS ( 2021 22:08 )  x     / <0.01 ng/mL / x     / x     / x          RADIOLOGY:    PHYSICAL EXAM:  GEN: weak appearing on Bipap 14/8 100% satting 95%- then was placed on HFNC 92%  PULM/CHEST: Clear to auscultation bilaterally, no rales, rhonchi or wheezes   CVS: Regular rate and rhythm, S1-S2, no murmurs  ABD: Soft, non-tender, non-distended, +BS  EXT: No edema  NEURO: AAOx3    Barrett Catheter:

## 2021-01-14 NOTE — CONSULT NOTE ADULT - ASSESSMENT
76 yo F, healthy other than hx of HTN, diagnosed with Covid about 19 days ago, presents to ED for hypoxia and AMS    IMPRESSION;  COVID 19 with severe illness. SpO2 < 94% on RA and need for supplemental O2.  Pt is in the late inflammatory response phase ot the illness based on the onset of symptoms  No significant elevation of the inflammatory markers  procalcitonin 0.38  , not suggestive of a bacterial PNA.  Ferritin 836  CRP 8.34  Ddimers 102    RECOMMENDATIONS;  Target SpO2 92 % to 96 %  Dexamethasone 6 mg iv q24h for 10 days   Monitor for side effects: hyperglycemia, neurological ( agitation/confusion), adrenal suppression, bacterial and fungal infections  No RDV/plasma/tociluzimab

## 2021-01-15 NOTE — PROGRESS NOTE ADULT - ASSESSMENT
IMPRESSION:    Acute hypoxemic respiratory failure worsening sp intubation  severe COVID PNA  HTN  DWAYNE    PLAN:    CNS: versed, fentanyl    HEENT: Oral care    PULMONARY:  HOB @ 45 degrees. increase PEEP, dec FIO2, KEEP SAO2 92 TO 96%, Plateau less thn 30    CARDIOVASCULAR: I = O. Avoid volume overload.  dc levophed, cheetah    GI: GI prophylaxis.  ngt    RENAL:  Follow up lytes.  Correct as needed.    INFECTIOUS DISEASE: Follow up cultures.  repeat inflammatory markers, ABX per ID    HEMATOLOGICAL:  DVT prophylaxis. LE doppler    ENDOCRINE:  Follow up FS.    MUSCULOSKELETAL: Bedrest    ICU monitoring    overall prognosis is poor

## 2021-01-15 NOTE — PROGRESS NOTE ADULT - SUBJECTIVE AND OBJECTIVE BOX
OVERNIGHT EVENTS: events noted sp intubation, on propofol, fentanyl, levophed    Vital Signs Last 24 Hrs  T(C): 36.1 (15 Ant 2021 14:40), Max: 36.9 (15 Ant 2021 02:00)  T(F): 96.9 (15 Ant 2021 14:40), Max: 98.4 (15 Ant 2021 02:00)  HR: 44 (15 Ant 2021 17:00) (40 - 87)  BP: 154/75 (15 Ant 2021 17:00) (101/70 - 169/73)  BP(mean): 103 (15 Ant 2021 17:00) (74 - 103)  RR: 26 (15 Ant 2021 17:00) (18 - 30)  SpO2: 93% (15 Ant 2021 17:00) (89% - 100%)    PHYSICAL EXAMINATION:    GENERAL: sedated    HEENT: Head is normocephalic and atraumatic.     NECK: Supple.    LUNGS: bl crackles    HEART: Regular rate and rhythm without murmur.    ABDOMEN: Soft, nontender, and nondistended.      EXTREMITIES: Without any cyanosis, clubbing, rash, lesions or edema.    NEUROLOGIC: non focal    SKIN: No ulceration or induration present.      LABS:                        12.1   11.29 )-----------( 395      ( 15 Ant 2021 11:06 )             37.1     01-15    145  |  111<H>  |  35<H>  ----------------------------<  213<H>  4.6   |  19  |  1.9<H>    Ca    9.0      15 Ant 2021 11:06  Phos  2.4     01-14  Mg     2.3     01-14    TPro  6.9  /  Alb  3.3<L>  /  TBili  0.3  /  DBili  x   /  AST  32  /  ALT  16  /  AlkPhos  68  01-15        ABG - ( 15 Ant 2021 04:30 )  pH, Arterial: 7.32  pH, Blood: x     /  pCO2: 38    /  pO2: 65    / HCO3: 19    / Base Excess: -6.4  /  SaO2: 92          400/18/80/10          D-Dimer Assay, Quantitative: 274 ng/mL DDU (01-15-21 @ 04:30)  D-Dimer Assay, Quantitative: 285 ng/mL DDU (01-14-21 @ 20:00)        Procalcitonin, Serum: 0.38 ng/mL (01-12-21 @ 22:08)        01-14-21 @ 07:01  -  01-15-21 @ 07:00  --------------------------------------------------------  IN: 231.5 mL / OUT: 200 mL / NET: 31.5 mL    01-15-21 @ 07:01  -  01-15-21 @ 18:11  --------------------------------------------------------  IN: 1293.1 mL / OUT: 120 mL / NET: 1173.1 mL        MICROBIOLOGY:      MEDICATIONS  (STANDING):  cefTRIAXone   IVPB 2000 milliGRAM(s) IV Intermittent once  chlorhexidine 4% Liquid 1 Application(s) Topical <User Schedule>  dexAMETHasone  Injectable 6 milliGRAM(s) IV Push daily  enoxaparin Injectable 40 milliGRAM(s) SubCutaneous daily  famotidine    Tablet 40 milliGRAM(s) Oral daily  fentaNYL    Injectable 25 MICROGram(s) IV Push once  fentaNYL   Infusion. 0.5 MICROgram(s)/kG/Hr (2.5 mL/Hr) IV Continuous <Continuous>  norepinephrine Infusion 0.05 MICROgram(s)/kG/Min (4.69 mL/Hr) IV Continuous <Continuous>  propofol Infusion 15 MICROgram(s)/kG/Min (4.5 mL/Hr) IV Continuous <Continuous>    MEDICATIONS  (PRN):      RADIOLOGY & ADDITIONAL STUDIES:

## 2021-01-15 NOTE — PROCEDURE NOTE - NSPOSTCAREGUIDE_GEN_A_CORE
Verbal/written post procedure instructions were given to patient/caregiver
Verbal/written post procedure instructions were given to patient/caregiver
Verbal/written post procedure instructions were given to patient/caregiver/Care for catheter as per unit/ICU protocols

## 2021-01-15 NOTE — ED ADULT NURSE REASSESSMENT NOTE - NS ED NURSE REASSESS COMMENT FT1
ICU MD @ the bedside for central line placement. Pt stable with ventilator and medications. report called ot next primary RN, Anshul, in ED3. Will continue to monitor until transferred.

## 2021-01-15 NOTE — PROGRESS NOTE ADULT - ASSESSMENT
74 yo F, healthy other than hx of HTN, diagnosed with Covid about 19 days ago, presents to ED for hypoxia and AMS    Patient initially placed on Bipap but was found to be hypoxic and agitated on 01/15 and was intubated and sedated. Patient accepted for ICU and pending bed availability.    O2 Source: MV: 400/18/10/80% saturation: 100%. Can reduce to 60%  AB.26/46/84/21  Drips: propofol 20, Fentanyl 1.5, Levo 0.16  Lines: Right TLC  Imaging: Bilateral opacities on Xray  Markers: Procal 0.38, CRP 8.34, Ferritin 836, d dimer 274  DVT: Lovenox Daily    # Acute hypoxic respiratory failure, possible CAP causing AMS  # Hx of COVID PNA Day 19  - Initially on Bipap and was intubated on 01/15 for distress  -  inflammatory markers- cp 8.3 ferritin 836 procal 0.38 pending ID consult for toci  -  f/u MRSA swab, strep, legionella antigen (Not sent yet. Will request RN again)  - Continue Meropenem (Start date 01/15)  - Was on Rocephin and Azithro before Meropenem  - Continue Decadron  - Transfer to ICU    #HTN   - Continue Valsartan  - Hold Atenolol as HR in the 40s    #DWAYNE   -resolved    Activity as tolerated   Diet DASH/TLC  DVT: Lovenox  GI: Pepcid  Dispo from home   Code full  74 yo F, healthy other than hx of HTN, diagnosed with Covid about 19 days ago, presents to ED for hypoxia and AMS    Patient initially placed on Bipap but was found to be hypoxic and agitated on 01/15 and was intubated and sedated. Patient accepted for ICU and pending bed availability.    O2 Source: MV: 400/18/10/80% saturation: 100%. Can reduce to 60%  AB.26/46/84/21  Drips: propofol 20, Fentanyl 1.5, Levo 0.16  Lines: Right TLC  Imaging: Bilateral opacities on Xray  Markers: Procal 0.38, CRP 8.34, Ferritin 836, d dimer 274  DVT: Lovenox Daily    # Acute hypoxic respiratory failure, possible CAP causing AMS  # Hx of COVID PNA Day   - Initially on Bipap and was intubated on 01/15 for distress  -  inflammatory markers- cp 8.3 ferritin 836 procal 0.38 pending ID consult for toci  -  f/u MRSA swab, strep, legionella antigen (Not sent yet. Will request RN again)  - Continue Meropenem (Start date 01/15)  - Was on Rocephin and Azithro before Meropenem  - Continue Decadron  - Transfer to ICU    #HTN   - Continue Valsartan  - Hold Atenolol as HR in the 40s    #DWAYNE   -resolved    Patient on Raloxifene. Will hold as would increase risk for VTE given her COVID status    Activity as tolerated   Diet DASH/TLC  DVT: Lovenox  GI: Pepcid  Dispo from home   Code full  76 yo F, healthy other than hx of HTN, diagnosed with Covid about 19 days ago, presents to ED for hypoxia and AMS    Patient initially placed on Bipap but was found to be hypoxic and agitated on 01/15 and was intubated and sedated. Patient accepted for ICU and pending bed availability.    O2 Source: MV: 400/18/10/80% saturation: 100%. Can reduce to 60%  AB.26/46/84/21  Drips: propofol 20, Fentanyl 1.5, Levo 0.16  Lines: Right TLC  Imaging: Bilateral opacities on Xray  Markers: Procal 0.38, CRP 8.34, Ferritin 836, d dimer 274  DVT: Lovenox Daily    # Acute hypoxic respiratory failure, possible CAP causing AMS  # Hx of COVID PNA Day   - Initially on Bipap and was intubated on 01/15 for distress  -  inflammatory markers- cp 8.3 ferritin 836 procal 0.38 pending ID consult for toci  -  f/u MRSA swab, strep, legionella antigen (Not sent yet. Will request RN again)  - Continue Meropenem (Start date 01/15)  - Was on Rocephin and Azithro before Meropenem  - Continue Decadron  - Transfer to ICU    #HTN   - Continue Valsartan  - Hold Atenolol as HR in the 40s    #DWAYNE   -resolved    # HAGMA with Respiratory Acidosis possible 2/2 elevated lactate in hypoxia  - Bicarb 15 at 8pm improving to 21 on ABG in AM  - Co2 46. Can increase rate and decrease FiO2    Patient on Raloxifene. Will hold as would increase risk for VTE given her COVID status    Activity as tolerated   Diet DASH/TLC  DVT: Lovenox  GI: Pepcid  Dispo from home   Code full  74 yo F, healthy other than hx of HTN, diagnosed with Covid about 19 days ago, presents to ED for hypoxia and AMS    Patient initially placed on Bipap but was found to be hypoxic and agitated on 01/15 and was intubated and sedated. Patient accepted for ICU and pending bed availability.    O2 Source: MV: 400/18/10/80% saturation: 100%. Rate increased to 20 and FiO2 dropped to 70%  AB.26/46/84/21  Drips: propofol 20, Fentanyl 1.5, Levo 0.16  Lines: Right TLC  Imaging: Bilateral opacities on Xray  Markers: Procal 0.38, CRP 8.34, Ferritin 836, d dimer 274  DVT: Lovenox Daily    # Acute hypoxic respiratory failure, possible CAP causing AMS  # Hx of COVID PNA Day 19  - Initially on Bipap and was intubated on 01/15 for distress  -  inflammatory markers- cp 8.3 ferritin 836 procal 0.38 pending ID consult for toci  -  f/u MRSA swab, strep, legionella antigen (Not sent yet. Will request RN again)  - Continue Meropenem (Start date 01/15)  - Was on Rocephin and Azithro before Meropenem  - Continue Decadron  - Transfer to ICU    #HTN   - Continue Valsartan  - Hold Atenolol as HR in the 40s    #DWAYNE   -resolved    # HAGMA with Respiratory Acidosis possible 2/2 elevated lactate in hypoxia  - Bicarb 15 at 8pm improving to 21 on ABG in AM  - Co2 46. Can increase rate and decrease FiO2    Patient on Raloxifene. Will hold as would increase risk for VTE given her COVID status    Activity as tolerated   Diet DASH/TLC  DVT: Lovenox  GI: Pepcid  Dispo from home   Code full  74 yo F, healthy other than hx of HTN, diagnosed with Covid about 19 days ago, presents to ED for hypoxia and AMS    Patient initially placed on Bipap but was found to be hypoxic and agitated on 01/15 and was intubated and sedated. Patient accepted for ICU and pending bed availability.    Spoke with Mr. Clemente regarding the situation of the patient and the questions regarding Remdesivir and Plasma therapy.    O2 Source: MV: 400/18/10/80% saturation: 100%. Rate increased to 20 and FiO2 dropped to 70%  AB.26/46/84/21  Drips: propofol 20, Fentanyl 1.5, Levo 0.16  Lines: Right TLC  Imaging: Bilateral opacities on Xray  Markers: Procal 0.38, CRP 8.34, Ferritin 836, d dimer 274  DVT: Lovenox Daily    # Acute hypoxic respiratory failure, possible CAP causing AMS  # Hx of COVID PNA Day 19  - Initially on Bipap and was intubated on 01/15 for distress  -  inflammatory markers- cp 8.3 ferritin 836 procal 0.38 pending ID consult for toci  -  f/u MRSA swab, strep, legionella antigen (Not sent yet. Will request RN again)  - Continue Meropenem (Start date 01/15)  - Was on Rocephin and Azithro before Meropenem  - Continue Decadron  - Transfer to ICU    #HTN   - Hold Valsartan  - Hold Atenolol as HR in the 40s    #DWAYNE   -resolved    # HAGMA with Respiratory Acidosis possible 2/2 elevated lactate in hypoxia  - Bicarb 15 at 8pm improving to 21 on ABG in AM  - Co2 46. Can increase rate and decrease FiO2    Patient on Raloxifene. Will hold as would increase risk for VTE given her COVID status    Activity as tolerated   Diet DASH/TLC  DVT: Lovenox  GI: Pepcid  Dispo from home   Code full  74 yo F, healthy other than hx of HTN, diagnosed with Covid about 19 days ago, presents to ED for hypoxia and AMS    Patient initially placed on Bipap but was found to be hypoxic and agitated on 01/15 and was intubated and sedated. Patient accepted for ICU and pending bed availability.    Spoke with Mr. Clemente regarding the situation of the patient and the questions regarding Remdesivir and Plasma therapy.    O2 Source: MV: 400/18/10/80% saturation: 100%. Rate increased to 20 and FiO2 dropped to 70%  AB.26/46/84/21  Drips: propofol 20, Fentanyl 1.5, Levo 0.16  Lines: Right TLC  Imaging: Bilateral opacities on Xray  Markers: Procal 0.38, CRP 8.34, Ferritin 836, d dimer 274  DVT: Lovenox Daily    # Acute hypoxic respiratory failure, possible CAP causing AMS  # Hx of COVID PNA Day 19  - Initially on Bipap and was intubated on 01/15 for distress  -  inflammatory markers- cp 8.3 ferritin 836 procal 0.38 pending ID consult for toci  -  f/u MRSA swab, strep, legionella antigen (Not sent yet. Will request RN again)  - Continue Meropenem (Start date 01/15)  - Was on Rocephin and Azithro before Meropenem  - Continue Decadron  - Transfer to ICU    #HTN   - Hold Valsartan  - Hold Atenolol as HR in the 40s    #DWAYNE   -resolved    # HAGMA with Respiratory Acidosis possible 2/2 elevated lactate in hypoxia  - Bicarb 15 at 8pm improving to 21 on ABG in AM  - Co2 46. Can increase rate and decrease FiO2    # DWAYNE likely Prerenal  - Given 1L of LR Bolus as fluid challenge  - Monitor Cr.  - Can get Urine lytes    Patient on Raloxifene. Will hold as would increase risk for VTE given her COVID status    Activity as tolerated   Diet DASH/TLC  DVT: Lovenox  GI: Pepcid  Dispo from home   Code full

## 2021-01-15 NOTE — CHART NOTE - NSCHARTNOTEFT_GEN_A_CORE
Critical Care Nursing team called a Rapid Response on this patient for acute respiratory distress, agitation, and hypoxia ~ 12:00 am on 1/15/21. ED Critical Care team (Dr. Thomas, Dr. Yu, Dr. Israel) responded with medical team. Patient was agitated and hypoxic and not tolerating BIPAP. O2 sat was in the 70's and imminent Respiratory failure was a concern. Medical team asked for assistance with emergent endotracheal intubation and Anesthesia was not immediately available. Patient had delayed sequence intubation with Ketamine as documented. CXR reviewed and ETT tube in appropriate position. OG tube also placed by ED team. This is our only involvement in this patients care and continued critical care provided by ICU/Medicine team.

## 2021-01-15 NOTE — PROGRESS NOTE ADULT - SUBJECTIVE AND OBJECTIVE BOX
ANTOINETTE FENG  75y, Female    All available historical data reviewed    OVERNIGHT EVENTS:  no fevers  fio2 70%    ROS:  unable to obtain history secondary to patient's mental status and/or sedation    VITALS:  T(F): 96.9, Max: 98.4 (01-15-21 @ 02:00)  HR: 54  BP: 101/70  RR: 20Vital Signs Last 24 Hrs  T(C): 36.1 (15 Ant 2021 14:40), Max: 36.9 (15 Ant 2021 02:00)  T(F): 96.9 (15 Ant 2021 14:40), Max: 98.4 (15 Ant 2021 02:00)  HR: 54 (15 Ant 2021 14:40) (44 - 87)  BP: 101/70 (15 Ant 2021 14:35) (101/70 - 169/73)  BP(mean): 93 (15 Ant 2021 14:35) (74 - 93)  RR: 20 (15 Ant 2021 14:40) (18 - 30)  SpO2: 94% (15 Ant 2021 14:40) (86% - 99%)    TESTS & MEASUREMENTS:                        12.1   11.29 )-----------( 395      ( 15 Ant 2021 11:06 )             37.1     01-15    145  |  111<H>  |  35<H>  ----------------------------<  213<H>  4.6   |  19  |  1.9<H>    Ca    9.0      15 Ant 2021 11:06  Phos  2.4     01-14  Mg     2.3     01-14    TPro  6.9  /  Alb  3.3<L>  /  TBili  0.3  /  DBili  x   /  AST  32  /  ALT  16  /  AlkPhos  68  01-15    LIVER FUNCTIONS - ( 15 Ant 2021 11:06 )  Alb: 3.3 g/dL / Pro: 6.9 g/dL / ALK PHOS: 68 U/L / ALT: 16 U/L / AST: 32 U/L / GGT: x                   RADIOLOGY & ADDITIONAL TESTS:  Personal review of radiological diagnostics performed  Echo and EKG results noted when applicable.     MEDICATIONS:  chlorhexidine 4% Liquid 1 Application(s) Topical <User Schedule>  dexAMETHasone  Injectable 6 milliGRAM(s) IV Push daily  enoxaparin Injectable 40 milliGRAM(s) SubCutaneous daily  famotidine    Tablet 40 milliGRAM(s) Oral daily  fentaNYL    Injectable 25 MICROGram(s) IV Push once  fentaNYL   Infusion. 0.5 MICROgram(s)/kG/Hr IV Continuous <Continuous>  meropenem  IVPB 1000 milliGRAM(s) IV Intermittent every 12 hours  norepinephrine Infusion 0.05 MICROgram(s)/kG/Min IV Continuous <Continuous>  propofol Infusion 15 MICROgram(s)/kG/Min IV Continuous <Continuous>      ANTIBIOTICS:  meropenem  IVPB 1000 milliGRAM(s) IV Intermittent every 12 hours

## 2021-01-15 NOTE — PROGRESS NOTE ADULT - ASSESSMENT
· Assessment	  74 yo F, healthy other than hx of HTN, diagnosed with Covid about 19 days ago, presents to ED for hypoxia and AMS    IMPRESSION;  COVID 19 with critical illness. MV or end organ damage as seen in sepsis/septic shock.   ARDS with Fio2 70%  MSOF  Worsening renal function  Pt is in the late inflammatory response phase ot the illness based on the onset of symptoms  procalcitonin 0.38    Ferritin 836  CRP 8.34  Ddimers 102>274    RECOMMENDATIONS;  Deep ET cultures  D/c meropenem  rocephin 2 gm iv q24h  Dexamethasone 6 mg iv q24h for 10 days   Monitor for side effects: hyperglycemia, neurological ( agitation/confusion), adrenal suppression, bacterial and fungal infections

## 2021-01-15 NOTE — CHART NOTE - NSCHARTNOTEFT_GEN_A_CORE
RRT was called for hypoxia. Pt was desaturating to 60s and was very agitated. Pt was given ativan x2 before but did not help.   Pt intubated for acute hypoxic respiratory failure in the ER.   Pt was started on propofol andf fentanyl with multiple pushes given for sedation as pt was bucking the vent.  Pt required low dose levo to maintain MAP >60. Right TLC was placed emergently for venous access. Started on meropenem.     Update given to Bryn Farooq - [phone]     PLAN  -f/u CXR for TLC and intubation  -F/u ABG post intubation   -F/u AM routine labs and inflam markers  -Consider CT angio to rule out PE.   -ABG, and CXR in the AM.   -ID follow up     Please give updates at the contact number mentioned above.    Sign out given to Dr Veliz [phone]

## 2021-01-15 NOTE — PROGRESS NOTE ADULT - SUBJECTIVE AND OBJECTIVE BOX
SUBJECTIVE:    Patient is a 75y old Female who presents with a chief complaint of hypoxia, AMS (14 Jan 2021 17:06)    Currently admitted to medicine with the primary diagnosis of Hypoxia       Today is hospital day 2d. This morning she is resting comfortably in bed and reports no new issues or overnight events.     INTERVAL EVENTS: Patient intubated overnight due to desaturation on the Bipap    PAST MEDICAL & SURGICAL HISTORY  Hypertension        ALLERGIES:  No Known Allergies    MEDICATIONS:  STANDING MEDICATIONS  amLODIPine   Tablet 2.5 milliGRAM(s) Oral daily  chlorhexidine 4% Liquid 1 Application(s) Topical <User Schedule>  dexAMETHasone  Injectable 6 milliGRAM(s) IV Push daily  dexMEDEtomidine Infusion 0.8 MICROgram(s)/kG/Hr IV Continuous <Continuous>  dexMEDEtomidine Infusion 0.2 MICROgram(s)/kG/Hr IV Continuous <Continuous>  enoxaparin Injectable 40 milliGRAM(s) SubCutaneous daily  fentaNYL    Injectable 25 MICROGram(s) IV Push once  fentaNYL   Infusion. 0.5 MICROgram(s)/kG/Hr IV Continuous <Continuous>  meropenem  IVPB 1000 milliGRAM(s) IV Intermittent every 12 hours  norepinephrine Infusion 0.05 MICROgram(s)/kG/Min IV Continuous <Continuous>  propofol Infusion 15 MICROgram(s)/kG/Min IV Continuous <Continuous>  raloxifene 60 milliGRAM(s) Oral daily  valsartan 160 milliGRAM(s) Oral daily    PRN MEDICATIONS    VITALS:   T(F): 98.4  HR: 47  BP: 114/56  RR: 18  SpO2: 98%    LABS:                        13.1   7.07  )-----------( 274      ( 14 Jan 2021 20:00 )             40.5     01-14    143  |  113<H>  |  24<H>  ----------------------------<  127<H>  5.2<H>   |  15<L>  |  1.0    Ca    9.2      14 Jan 2021 20:00  Phos  2.4     01-14  Mg     2.3     01-14    TPro  6.8  /  Alb  3.5  /  TBili  0.3  /  DBili  x   /  AST  52<H>  /  ALT  17  /  AlkPhos  72  01-14        ABG - ( 15 Ant 2021 02:19 )  pH, Arterial: 7.26  pH, Blood: x     /  pCO2: 46    /  pO2: 84    / HCO3: 21    / Base Excess: -6.3  /  SaO2: 94                        RADIOLOGY:    PHYSICAL EXAM:  GEN: Intubated and sedated  PULM/CHEST: Coarse breath sounds  CVS: Bradycardic to 40s  ABD: Soft, non-tender, non-distended, +BS  EXT: No edema  NEURO: Sedated    Barrett Catheter:   Indwelling Urethral Catheter:     Connect To:  Straight Drainage/Gravity    Indication:  Urine Output Monitoring in Critically Ill (01-14-21 @ 23:55) (not performed) [Active]       SUBJECTIVE:    Patient is a 75y old Female who presents with a chief complaint of hypoxia, AMS (14 Jan 2021 17:06)    Currently admitted to medicine with the primary diagnosis of Hypoxia       Today is hospital day 2d. This morning she is resting comfortably in bed and reports no new issues or overnight events.     INTERVAL EVENTS: Patient intubated overnight due to desaturation on the Bipap    Attending Note:  Pt seen and examined at bedside. Pt intubated in evening, On vent now. Pt accepted to ICU     PAST MEDICAL & SURGICAL HISTORY  Hypertension        ALLERGIES:  No Known Allergies    MEDICATIONS:  STANDING MEDICATIONS  amLODIPine   Tablet 2.5 milliGRAM(s) Oral daily  chlorhexidine 4% Liquid 1 Application(s) Topical <User Schedule>  dexAMETHasone  Injectable 6 milliGRAM(s) IV Push daily  dexMEDEtomidine Infusion 0.8 MICROgram(s)/kG/Hr IV Continuous <Continuous>  dexMEDEtomidine Infusion 0.2 MICROgram(s)/kG/Hr IV Continuous <Continuous>  enoxaparin Injectable 40 milliGRAM(s) SubCutaneous daily  fentaNYL    Injectable 25 MICROGram(s) IV Push once  fentaNYL   Infusion. 0.5 MICROgram(s)/kG/Hr IV Continuous <Continuous>  meropenem  IVPB 1000 milliGRAM(s) IV Intermittent every 12 hours  norepinephrine Infusion 0.05 MICROgram(s)/kG/Min IV Continuous <Continuous>  propofol Infusion 15 MICROgram(s)/kG/Min IV Continuous <Continuous>  raloxifene 60 milliGRAM(s) Oral daily  valsartan 160 milliGRAM(s) Oral daily    PRN MEDICATIONS    VITALS:   T(F): 98.4  HR: 47  BP: 114/56  RR: 18  SpO2: 98%    LABS:                        13.1   7.07  )-----------( 274      ( 14 Jan 2021 20:00 )             40.5     01-14    143  |  113<H>  |  24<H>  ----------------------------<  127<H>  5.2<H>   |  15<L>  |  1.0    Ca    9.2      14 Jan 2021 20:00  Phos  2.4     01-14  Mg     2.3     01-14    TPro  6.8  /  Alb  3.5  /  TBili  0.3  /  DBili  x   /  AST  52<H>  /  ALT  17  /  AlkPhos  72  01-14        ABG - ( 15 Ant 2021 02:19 )  pH, Arterial: 7.26  pH, Blood: x     /  pCO2: 46    /  pO2: 84    / HCO3: 21    / Base Excess: -6.3  /  SaO2: 94                        RADIOLOGY:    PHYSICAL EXAM:  GEN: Intubated and sedated  PULM/CHEST: Coarse breath sounds  CVS: Bradycardic to 40s  ABD: Soft, non-tender, non-distended, +BS  EXT: No edema  NEURO: Sedated    Barrett Catheter:   Indwelling Urethral Catheter:     Connect To:  Straight Drainage/Gravity    Indication:  Urine Output Monitoring in Critically Ill (01-14-21 @ 23:55) (not performed) [Active]

## 2021-01-15 NOTE — PROCEDURE NOTE - SUPERVISORY STATEMENT
I was physically present for and directly supervised this procedure.  Procedure was done as documented without any complications.
I was physically present for and directly supervised this procedure.  Procedure was done as documented without any complications.

## 2021-01-15 NOTE — PROGRESS NOTE ADULT - ATTENDING COMMENTS
#Progress Note Handoff  Pending (specify): ICU upgrade  Disposition: ICU  Pt seen during COVID 19 Pandemic.

## 2021-01-16 NOTE — PROCEDURAL SAFETY CHECKLIST WITH OR WITHOUT SEDATION - NSTEAMCONFIRM_GEN_ALL_CORE
done Ftsg Text: The defect edges were debeveled with a #15 scalpel blade.  Given the location of the defect, shape of the defect and the proximity to free margins a full thickness skin graft was deemed most appropriate.  Using a sterile surgical marker, the primary defect shape was transferred to the donor site. The area thus outlined was incised deep to adipose tissue with a #15 scalpel blade.  The harvested graft was then trimmed of adipose tissue until only dermis and epidermis was left.  The skin margins of the secondary defect were undermined to an appropriate distance in all directions utilizing iris scissors.  The secondary defect was closed with interrupted buried subcutaneous sutures.  The skin edges were then re-apposed with running  sutures.  The skin graft was then placed in the primary defect and oriented appropriately.

## 2021-01-16 NOTE — PROCEDURE NOTE - NSINDICATIONS_GEN_A_CORE
critical patient/respiratory distress
arterial puncture to obtain ABG's
critical illness
post-intubation

## 2021-01-16 NOTE — PROCEDURE NOTE - NSPROCNAME_GEN_A_CORE
Tracheal Intubation
Arterial Puncture/Cannulation
Gastric Intubation/Gastric Lavage
Central Line Insertion

## 2021-01-16 NOTE — PROGRESS NOTE ADULT - SUBJECTIVE AND OBJECTIVE BOX
Patient is a 75y old  Female who presents with a chief complaint of hypoxia, AMS (15 Ant 2021 18:11)        Over Night Events:  Remains on MV.  Off pressors.  Sedated         ROS:     All ROS are negative except HPI         PHYSICAL EXAM    ICU Vital Signs Last 24 Hrs  T(C): 36.3 (16 Jan 2021 08:00), Max: 36.7 (16 Jan 2021 00:00)  T(F): 97.4 (16 Jan 2021 08:00), Max: 98 (16 Jan 2021 00:00)  HR: 58 (16 Jan 2021 08:00) (40 - 104)  BP: 123/67 (16 Jan 2021 08:00) (79/49 - 170/85)  BP(mean): 91 (16 Jan 2021 08:00) (57 - 124)  ABP: --  ABP(mean): --  RR: 20 (16 Jan 2021 08:00) (16 - 34)  SpO2: 98% (16 Jan 2021 08:00) (83% - 100%)      CONSTITUTIONAL:  Well nourished.  Ill appearing. NAD    ENT:   Airway patent,   Mouth with normal mucosa.   No thrush    EYES:   Pupils equal,   Round and reactive to light.    CARDIAC:   Normal rate,   Regular rhythm.    No edema      Vascular:  Normal systolic impulse  No Carotid bruits    RESPIRATORY:   No wheezing  Bilateral BS  Normal chest expansion  Not tachypneic,  No use of accessory muscles    GASTROINTESTINAL:  Abdomen soft,   Non-tender,   No guarding,   + BS    MUSCULOSKELETAL:   Range of motion is not limited,  No clubbing, cyanosis    NEUROLOGICAL:   Sedated   No motor  deficits.    SKIN:   Skin normal color for race,   Warm and dry and intact.   No evidence of rash.    PSYCHIATRIC:   Sedated   No apparent risk to self or others.    HEMATOLOGICAL:  No cervical  lymphadenopathy.  no inguinal lymphadenopathy      01-15-21 @ 07:01  -  01-16-21 @ 07:00  --------------------------------------------------------  IN:    FentaNYL: 200.3 mL    IV PiggyBack: 50 mL    Lactated Ringers Bolus: 1000 mL    Norepinephrine: 136.1 mL    Propofol: 136.4 mL  Total IN: 1522.8 mL    OUT:    Indwelling Catheter - Urethral (mL): 390 mL  Total OUT: 390 mL    Total NET: 1132.8 mL          LABS:                            10.6   8.47  )-----------( 296      ( 16 Jan 2021 05:30 )             33.0                                               01-16    147<H>  |  114<H>  |  39<H>  ----------------------------<  111<H>  4.0   |  22  |  1.5    Creatinine Trend  BUN 39, Cr 1.5, (01-16-21 @ 05:30)  Creatinine Trend  BUN 35, Cr 1.9, (01-15-21 @ 11:06)  Creatinine Trend  BUN 24, Cr 1.0, (01-14-21 @ 20:00)  Creatinine Trend  BUN 34, Cr 1.1, (01-13-21 @ 04:30)  Creatinine Trend  BUN 39, Cr 1.5, (01-12-21 @ 22:08)      Ca    8.7      16 Jan 2021 05:30  Mg     2.3     01-16    TPro  5.8<L>  /  Alb  2.9<L>  /  TBili  <0.2  /  DBili  x   /  AST  35  /  ALT  14  /  AlkPhos  62  01-16                                                                                           LIVER FUNCTIONS - ( 16 Jan 2021 05:30 )  Alb: 2.9 g/dL / Pro: 5.8 g/dL / ALK PHOS: 62 U/L / ALT: 14 U/L / AST: 35 U/L / GGT: x                                                  Culture - Urine (collected 14 Jan 2021 21:01)  Source: .Urine Clean Catch (Midstream)  Final Report (15 Ant 2021 21:59):    <10,000 CFU/mL Normal Urogenital Mis                                                   Mode: AC/ CMV (Assist Control/ Continuous Mandatory Ventilation)  RR (machine): 20  TV (machine): 400  FiO2: 100  PEEP: 12  ITime: 1  MAP: 18  PIP: 30                                      ABG - ( 16 Jan 2021 03:13 )  pH, Arterial: 7.37  pH, Blood: x     /  pCO2: 40    /  pO2: 78    / HCO3: 23    / Base Excess: -2.0  /  SaO2: 95                  MEDICATIONS  (STANDING):  chlorhexidine 0.12% Liquid 15 milliLiter(s) Oral Mucosa two times a day  chlorhexidine 4% Liquid 1 Application(s) Topical <User Schedule>  dexAMETHasone  Injectable 6 milliGRAM(s) IV Push daily  enoxaparin Injectable 40 milliGRAM(s) SubCutaneous daily  famotidine    Tablet 40 milliGRAM(s) Oral daily  fentaNYL    Injectable 25 MICROGram(s) IV Push once  fentaNYL   Infusion. 0.5 MICROgram(s)/kG/Hr (2.5 mL/Hr) IV Continuous <Continuous>  midazolam Infusion 0.02 mG/kG/Hr (1.3 mL/Hr) IV Continuous <Continuous>  norepinephrine Infusion 0.05 MICROgram(s)/kG/Min (4.69 mL/Hr) IV Continuous <Continuous>  propofol Infusion 15 MICROgram(s)/kG/Min (4.5 mL/Hr) IV Continuous <Continuous>    MEDICATIONS  (PRN):      New X-rays reviewed:                                                                                  ECHO    CXR interpreted by me:  ET OG OGK>  Bilateral infiltrates

## 2021-01-16 NOTE — PROCEDURE NOTE - NSSITEPREP_SKIN_A_CORE
chlorhexidine
chlorhexidine/povidone iodine (if allergic to chlorhexidine)/povidone-iodine ( under 2 weeks of age or 1500 grams)/Adherence to aseptic technique: hand hygiene prior to donning barriers (gown, gloves), don cap and mask, sterile drape over patient

## 2021-01-16 NOTE — PROGRESS NOTE ADULT - ASSESSMENT
IMPRESSION:    Acute hypoxemic respiratory failure   severe COVID PNA  HTN  DWAYNE Improving.      PLAN:    CNS:     HEENT: Oral care    PULMONARY:  HOB @ 45 degrees. PEEP 14 Monitor PPL and DP     CARDIOVASCULAR: I = O. Avoid volume overload. LR 50 cc per hour     GI: GI prophylaxis.  OG feeding     RENAL:  Follow up lytes.  Correct as needed.    INFECTIOUS DISEASE: Follow up cultures.  repeat inflammatory markers, ABX Finish Course     HEMATOLOGICAL:  DVT prophylaxis. BID LMWH     ENDOCRINE:  Follow up FS.    MUSCULOSKELETAL: Bedrest    ICU monitoring    overall prognosis is poor

## 2021-01-17 NOTE — PROGRESS NOTE ADULT - SUBJECTIVE AND OBJECTIVE BOX
Patient is a 75y old  Female who presents with a chief complaint of hypoxia, AMS (16 Jan 2021 09:05)        Over Night Events:  On MV.  Sedated.  Off pressors.          ROS:     All ROS are negative except HPI         PHYSICAL EXAM    ICU Vital Signs Last 24 Hrs  T(C): 36.9 (17 Jan 2021 08:00), Max: 36.9 (17 Jan 2021 08:00)  T(F): 98.5 (17 Jan 2021 08:00), Max: 98.5 (17 Jan 2021 08:00)  HR: 56 (17 Jan 2021 09:05) (56 - 86)  BP: 100/52 (17 Jan 2021 05:00) (100/52 - 156/81)  BP(mean): 68 (17 Jan 2021 05:00) (68 - 114)  ABP: 88/44 (17 Jan 2021 08:00) (88/44 - 126/52)  ABP(mean): 60 (17 Jan 2021 08:00) (60 - 74)  RR: 20 (17 Jan 2021 08:00) (15 - 40)  SpO2: 99% (17 Jan 2021 09:05) (72% - 99%)      CONSTITUTIONAL:  Well nourished.  Ill appearing NAD    ENT:   Airway patent,   Mouth with normal mucosa.   No thrush    EYES:   Pupils equal,   Round and reactive to light.    CARDIAC:   Normal rate,   Regular rhythm.    No edema      Vascular:  Normal systolic impulse  No Carotid bruits    RESPIRATORY:   No wheezing  Bilateral BS  Normal chest expansion  Not tachypneic,  No use of accessory muscles    GASTROINTESTINAL:  Abdomen soft,   Non-tender,   No guarding,   + BS    MUSCULOSKELETAL:   Range of motion is not limited,  No clubbing, cyanosis    NEUROLOGICAL:   Sedated     SKIN:   Skin normal color for race,   Warm and dry   No evidence of rash.    PSYCHIATRIC:   Sedated   No apparent risk to self or others.    HEMATOLOGICAL:  No cervical  lymphadenopathy.  no inguinal lymphadenopathy      01-16-21 @ 07:01  -  01-17-21 @ 07:00  --------------------------------------------------------  IN:    FentaNYL: 372.9 mL    Lactated Ringers: 1100 mL    Midazolam: 21.5 mL  Total IN: 1494.4 mL    OUT:    Indwelling Catheter - Urethral (mL): 645 mL    Norepinephrine: 0 mL  Total OUT: 645 mL    Total NET: 849.4 mL      01-17-21 @ 07:01  -  01-17-21 @ 09:42  --------------------------------------------------------  IN:    FentaNYL: 52 mL    Lactated Ringers: 100 mL    Midazolam: 9 mL  Total IN: 161 mL    OUT:    Indwelling Catheter - Urethral (mL): 85 mL  Total OUT: 85 mL    Total NET: 76 mL          LABS:                            10.2   10.71 )-----------( 294      ( 17 Jan 2021 04:30 )             32.1                                               01-17    150<H>  |  118<H>  |  43<H>  ----------------------------<  109<H>  4.3   |  23  |  1.2    17 Jan 2021 04:30    150<H>  |  118<H>  |  43<H>  ----------------------------<  109<H>  4.3     |  23     |  1.2    16 Jan 2021 05:30    147<H>  |  114<H>  |  39<H>  ----------------------------<  111<H>  4.0     |  22     |  1.5      Ca    8.7        17 Jan 2021 04:30  Ca    8.7        16 Jan 2021 05:30  Mg     2.5<H>     17 Jan 2021 04:30  Mg     2.3       16 Jan 2021 05:30    TPro  5.5<L>  /  Alb  3.0<L>  /  TBili  0.4    /  DBili  x      /  AST  28     /  ALT  13     /  AlkPhos  55     17 Jan 2021 04:30  TPro  5.8<L>  /  Alb  2.9<L>  /  TBili  <0.2   /  DBili  x      /  AST  35     /  ALT  14     /  AlkPhos  62     16 Jan 2021 05:30      Ca    8.7      17 Jan 2021 04:30  Mg     2.5     01-17    TPro  5.5<L>  /  Alb  3.0<L>  /  TBili  0.4  /  DBili  x   /  AST  28  /  ALT  13  /  AlkPhos  55  01-17                                                                                           LIVER FUNCTIONS - ( 17 Jan 2021 04:30 )  Alb: 3.0 g/dL / Pro: 5.5 g/dL / ALK PHOS: 55 U/L / ALT: 13 U/L / AST: 28 U/L / GGT: x                                                  Culture - Blood (collected 15 Ant 2021 04:30)  Source: .Blood Blood  Preliminary Report (16 Jan 2021 17:01):    No growth to date.    Culture - Urine (collected 14 Jan 2021 21:01)  Source: .Urine Clean Catch (Midstream)  Final Report (15 Ant 2021 21:59):    <10,000 CFU/mL Normal Urogenital Mis                                                   Mode: AC/ CMV (Assist Control/ Continuous Mandatory Ventilation)  RR (machine): 20  TV (machine): 400  FiO2: 100  PEEP: 14  ITime: 1  MAP: 15  PIP: 30                                      ABG - ( 17 Jan 2021 03:00 )  pH, Arterial: 7.35  pH, Blood: x     /  pCO2: 44    /  pO2: 75    / HCO3: 24    / Base Excess: -1.4  /  SaO2: 94                  MEDICATIONS  (STANDING):  cefTRIAXone   IVPB 1000 milliGRAM(s) IV Intermittent every 24 hours  chlorhexidine 0.12% Liquid 15 milliLiter(s) Oral Mucosa two times a day  chlorhexidine 4% Liquid 1 Application(s) Topical <User Schedule>  dexAMETHasone  Injectable 6 milliGRAM(s) IV Push daily  enoxaparin Injectable 40 milliGRAM(s) SubCutaneous every 12 hours  fentaNYL    Injectable 25 MICROGram(s) IV Push once  fentaNYL   Infusion. 0.5 MICROgram(s)/kG/Hr (2.5 mL/Hr) IV Continuous <Continuous>  lactated ringers. 1000 milliLiter(s) (50 mL/Hr) IV Continuous <Continuous>  midazolam Infusion 0.02 mG/kG/Hr (1.3 mL/Hr) IV Continuous <Continuous>  norepinephrine Infusion 0.05 MICROgram(s)/kG/Min (4.69 mL/Hr) IV Continuous <Continuous>  pantoprazole   Suspension 40 milliGRAM(s) Oral daily    MEDICATIONS  (PRN):      New X-rays reviewed:                                                                                  ECHO    CXR interpreted by me:  ET OG OK>  Bilateral infiltrates

## 2021-01-17 NOTE — DIETITIAN INITIAL EVALUATION ADULT. - CONTINUE CURRENT NUTRITION CARE PLAN
pt to meet and tolerate  % of estimated kcal/protein via TF upon f/u in 3 days. Enteral nutrition. RD to monitor diet order, energy intake, NFPF (EN tolerance, renal profile, electrolytes)

## 2021-01-17 NOTE — DIETITIAN INITIAL EVALUATION ADULT. - ADD RECOMMEND
Rec: (1) Please adjust slightly on current tube feed regimen by ADDING 2 BENEPROTEIN PACKET PER DAY, and considering current status, pt would benefit from continuous feeding of 40cc/hr. This regimen with beneprotein gives a total of 1280 kcal/ 88g protein/ 777mL free water, additional flushes per ICU team.

## 2021-01-17 NOTE — DIETITIAN INITIAL EVALUATION ADULT. - OTHER CALCULATIONS
ABW: 65kg ----- CALORIE: 1400 kcal/day ([id number]);       PROTEIN: 78-97g/day (1.2-1.5 g/kg of ABW) renal status monitoring;            FLUID: per ICU team

## 2021-01-17 NOTE — DIETITIAN INITIAL EVALUATION ADULT. - REASON INDICATOR FOR ASSESSMENT
Found to be intubated to ventilator, LBM unknown. Ecchymosis to skin. No edema. Currently on Peptamen AF at 250ml q6hr - unable to determine tolerance as RN busy with another pt on a procedure. Ve 12.5, Temp 36.7c, /52, MAP 68.  on IV versed, fentanyl, IVF. Per rounds' note, pt to decrease sedation, c/w IVF.

## 2021-01-17 NOTE — PROGRESS NOTE ADULT - ASSESSMENT
IMPRESSION:    Acute hypoxemic respiratory failure   severe COVID PNA  HTN  DWAYNE Improving.    Hypernatremia     PLAN:    CNS: Decrease sedation     HEENT: Oral care    PULMONARY:  HOB @ 45 degrees.  Monitor PPL and DP     CARDIOVASCULAR: I = O. Avoid volume overload.  DC LR.  D5W     GI: GI prophylaxis.  OG feeding     RENAL:  Follow up lytes.  Correct as needed.    INFECTIOUS DISEASE: Follow up cultures.  repeat inflammatory markers, ABX Finish Course     HEMATOLOGICAL:  DVT prophylaxis. BID LMWH     ENDOCRINE:  Follow up FS.    MUSCULOSKELETAL: Bedrest    ICU monitoring    overall prognosis is poor

## 2021-01-18 NOTE — PROGRESS NOTE ADULT - ASSESSMENT
IMPRESSION:    Acute hypoxemic respiratory failure   severe COVID PNA  HTN  DWAYNE Improving.    Hypernatremia     PLAN:    CNS: Keep sedated and paralyzed.  CTH when more stable     HEENT: Oral care    PULMONARY:  HOB @ 45 degrees.  Monitor PPL and DP.   RR 28.  Wean o2 as tolerated.  I:E 1:1       CARDIOVASCULAR: I < O. Avoid volume overload.  LASIX 40 once      GI: GI prophylaxis.  OG feeding.  Bowel Regimen     RENAL:  Follow up lytes.  Correct as needed.    INFECTIOUS DISEASE: Follow up cultures.  Repeat inflammatory markers, ABX Finish Course     HEMATOLOGICAL:  DVT prophylaxis. BID LMWH     ENDOCRINE:  Follow up FS.    MUSCULOSKELETAL: Bedrest    ICU monitoring    overall prognosis is poor         IMPRESSION:    Acute hypoxemic respiratory failure   severe COVID PNA  HTN  DWAYNE Improving.    Hypernatremia     PLAN:    CNS: Keep sedated and paralyzed.  CTH when more stable     HEENT: Oral care    PULMONARY:  HOB @ 45 degrees.  Monitor PPL and DP.   RR 28.  Wean o2 as tolerated.  I:E 1:1       CARDIOVASCULAR: I < O. Avoid volume overload.       GI: GI prophylaxis.  OG feeding.  Bowel Regimen     RENAL:  Follow up lytes.  Correct as needed.    INFECTIOUS DISEASE: Follow up cultures.  Repeat inflammatory markers, ABX Finish Course     HEMATOLOGICAL:  DVT prophylaxis. BID LMWH     ENDOCRINE:  Follow up FS.    MUSCULOSKELETAL: Bedrest    ICU monitoring    overall prognosis is poor

## 2021-01-18 NOTE — PROGRESS NOTE ADULT - SUBJECTIVE AND OBJECTIVE BOX
SUBJECTIVE:    Patient is a 75y old Female who presents with a chief complaint of hypoxia, AMS (18 Jan 2021 12:07)    Currently admitted to medicine with the primary diagnosis of Hypoxia       Today is hospital day 5d. Overnight patient was desatting to 60's, started on nimbex gtt w/ improvement in O2 saturation.     PAST MEDICAL & SURGICAL HISTORY  Hypertension      SOCIAL HISTORY:  Negative for smoking/alcohol/drug use.     ALLERGIES:  No Known Allergies    MEDICATIONS:  STANDING MEDICATIONS  cefTRIAXone   IVPB 1000 milliGRAM(s) IV Intermittent every 24 hours  chlorhexidine 0.12% Liquid 15 milliLiter(s) Oral Mucosa two times a day  chlorhexidine 4% Liquid 1 Application(s) Topical <User Schedule>  cisatracurium Infusion 3 MICROgram(s)/kG/Min IV Continuous <Continuous>  dexAMETHasone  Injectable 6 milliGRAM(s) IV Push daily  enoxaparin Injectable 40 milliGRAM(s) SubCutaneous every 12 hours  fentaNYL    Injectable 25 MICROGram(s) IV Push once  fentaNYL   Infusion. 0.5 MICROgram(s)/kG/Hr IV Continuous <Continuous>  midazolam Infusion 0.02 mG/kG/Hr IV Continuous <Continuous>  norepinephrine Infusion 0.05 MICROgram(s)/kG/Min IV Continuous <Continuous>  pantoprazole   Suspension 40 milliGRAM(s) Oral daily  polyethylene glycol 3350 17 Gram(s) Oral daily  senna 2 Tablet(s) Oral at bedtime    PRN MEDICATIONS    VITALS:   T(F): 97  HR: 64  BP: --  RR: 27  SpO2: 94%      PHYSICAL EXAM:  GEN: Intubated, sedated   LUNGS: Decreased breath sounds bilaterally   HEART: S1/S2 present.   ABD: Soft, non-distended.  EXT: No edema   NEURO: sedated, non focal     Barrett catheter present     LABS:                        10.0   10.86 )-----------( 257      ( 18 Jan 2021 04:50 )             31.4     01-18    141  |  110  |  50<H>  ----------------------------<  168<H>  4.0   |  23  |  1.3    Ca    8.5      18 Jan 2021 04:50  Mg     2.5     01-18    TPro  5.1<L>  /  Alb  2.7<L>  /  TBili  0.3  /  DBili  x   /  AST  25  /  ALT  12  /  AlkPhos  55  01-18        ABG - ( 18 Jan 2021 02:59 )  pH, Arterial: 7.31  pH, Blood: x     /  pCO2: 48    /  pO2: 67    / HCO3: 24    / Base Excess: -2.3  /  SaO2: 92        RADIOLOGY:    < from: Xray Chest 1 View-PORTABLE IMMEDIATE (Xray Chest 1 View-PORTABLE IMMEDIATE .) (01.17.21 @ 23:42) >  EXAM:  XR CHEST PORTABLE IMMED 1V          PROCEDURE DATE:  01/17/2021      INTERPRETATION:  Clinical History / Reason for exam: Covid    Comparison : Chest radiograph January 17, 2021.    Technique/Positioning: Adequate.    Findings:    Support devices: ETT with its tip above the bon, NGT with its tip below the diaphragm and right IJ line with tip overlying the SVC.    Cardiac/mediastinum/hilum: Stable    Lung parenchyma/Pleura: Diffuse bilateral opacities, unchanged. No pneumothorax is seen.    Skeleton/soft tissues: Stable    Impression:    Diffuse bilateral opacities, unchanged.    Support tubes and lines as above.    BJ BARAJAS MD; Attending Radiologist  This document has been electronically signed. Jan 18 2021 6:44AM    < end of copied text >

## 2021-01-18 NOTE — PROGRESS NOTE ADULT - ASSESSMENT
76 yo Female with PMHx of HTN, diagnosed with Covid about 19 days ago, presents to ED for hypoxia and AMS    Patient initially placed on Bipap but was found to be hypoxic and agitated on 01/15 and was intubated and sedated. Patient accepted for ICU and pending bed availability.    Spoke with Mr. Clemente regarding the situation of the patient and the questions regarding Remdesivir and Plasma therapy.    O2 Source: MV: 400/18/10/80% saturation: 100%. Rate increased to 20 and FiO2 dropped to 70%  AB.26/46/84/21  Drips: propofol 20, Fentanyl 1.5, Levo 0.16  Lines: Right TLC  Imaging: Bilateral opacities on Xray  Markers: Procal 0.38, CRP 8.34, Ferritin 836, d dimer 274  DVT: Lovenox Daily    # Acute hypoxic respiratory failure, possible CAP causing AMS  # Hx of COVID PNA Day 19  - Initially on Bipap and was intubated on 01/15 for distress  -  inflammatory markers- cp 8.3 ferritin 836 procal 0.38 pending ID consult for toci  -  f/u MRSA swab, strep, legionella antigen (Not sent yet. Will request RN again)  - Continue Meropenem (Start date 01/15)  - Was on Rocephin and Azithro before Meropenem  - Continue Decadron  - Transfer to ICU    #HTN   - Hold Valsartan  - Hold Atenolol as HR in the 40s    #DWAYNE   -resolved    # HAGMA with Respiratory Acidosis possible 2/2 elevated lactate in hypoxia  - Bicarb 15 at 8pm improving to 21 on ABG in AM  - Co2 46. Can increase rate and decrease FiO2    # DWAYNE likely Prerenal  - Given 1L of LR Bolus as fluid challenge  - Monitor Cr.  - Can get Urine lytes    Patient on Raloxifene. Will hold as would increase risk for VTE given her COVID status    Diet: NPO w/ tube feeds   DVT ppx: Lovenox  GI ppx: Protonix 40 qd   Code full    76 yo Female with PMHx of HTN, diagnosed with Covid about 19 days ago, presents to ED for hypoxia and AMS    # Acute hypoxic respiratory failure, possible CAP causing AMS  # Hx of COVID PNA Day 19  - Initially on Bipap and was intubated on 01/15 for distress  -  f/u inflammatory markers  - f/u ID reccs   - Continue Meropenem  - s/p Rocephin and Azithro  - Continue Decadron    #HTN   - Holding Valsartan  - Holding Atenolol    #DWAYNE   -resolved  - monitor creatining     Patient on Raloxifene. Will hold as would increase risk for VTE given her COVID status    Diet: NPO w/ tube feeds   DVT ppx: Lovenox  GI ppx: Protonix 40 qd   Code full    74 yo Female with PMHx of HTN, diagnosed with Covid about 19 days ago, presents to ED for hypoxia and AMS    # Acute hypoxic respiratory failure, possible CAP causing AMS  # Hx of COVID PNA Day 19  - Initially on Bipap and was intubated on 01/15 for distress  -  f/u inflammatory markers  - f/u ID reccs   - Continue Meropenem  - s/p Rocephin and Azithro  - Continue Decadron    #HTN   - Holding Valsartan  - Holding Atenolol    #DWAYNE   -resolved  - monitor creatining     Patient on Raloxifene. Will hold as would increase risk for VTE given her COVID status    Diet: NPO w/ tube feeds   DVT ppx: Lovenox  GI ppx: Protonix 40 qd   Code full     Spoke to patient's  and updated him.  76 yo Female with PMHx of HTN, diagnosed with Covid about 19 days ago, presents to ED for hypoxia and AMS    # Acute hypoxic respiratory failure, possible CAP causing AMS  # Hx of COVID PNA Day 19  - Initially on Bipap and was intubated on 01/15 for distress  -  f/u inflammatory markers  - f/u ID reccs   - Continue Meropenem  - s/p Rocephin and Azithro  - Continue Decadron    #HTN   - Holding Valsartan  - Holding Atenolol    #DWAYNE   -resolved  - monitor creatining     Patient on Raloxifene. Will hold as would increase risk for VTE given her COVID status    Diet: NPO w/ tube feeds   DVT ppx: Lovenox  GI ppx: Protonix 40 qd   Code full     Spoke to patient's  and updated him. Spoke with patient's son and daughter in law, [phone], updated them on patient's condition.

## 2021-01-18 NOTE — PROGRESS NOTE ADULT - ASSESSMENT
74 yo F, healthy other than hx of HTN, diagnosed with Covid about 19 days ago, presents to ED for hypoxia and AMS    IMPRESSION;  COVID 19 with critical illness. MV or end organ damage as seen in sepsis/septic shock.   ARDS with Fio2 100%  MSOF  renal function: improving  Pt is in the late inflammatory response phase ot the illness based on the onset of symptoms  procalcitonin 0.38    Ferritin 836  CRP 8.34  Ddimers 102>274  Blood & Urine cxs NGTD 1/14  Nares ORSA NG  legionella antigen NG    RECOMMENDATIONS;  Deep ET cultures  rocephin 2 gm iv q24h  Dexamethasone 6 mg iv q24h for 10 days   Monitor for side effects: hyperglycemia, neurological ( agitation/confusion), adrenal suppression, bacterial and fungal infections

## 2021-01-18 NOTE — PROGRESS NOTE ADULT - SUBJECTIVE AND OBJECTIVE BOX
AMYNELSONANTOINETTE RUELAS  75y, Female    All available historical data reviewed    OVERNIGHT EVENTS:  no fevers  no pressors  fio2 100%  does not follow commands    ROS:  unable to obtain history secondary to patient's mental status and/or sedation    VITALS:  T(F): 97.3, Max: 97.3 (01-18-21 @ 08:00)  HR: 64  BP: --  RR: 5Vital Signs Last 24 Hrs  T(C): 36.3 (18 Jan 2021 08:00), Max: 36.3 (18 Jan 2021 08:00)  T(F): 97.3 (18 Jan 2021 08:00), Max: 97.3 (18 Jan 2021 08:00)  HR: 64 (18 Jan 2021 11:00) (52 - 70)  BP: --  BP(mean): --  RR: 5 (18 Jan 2021 11:00) (3 - 38)  SpO2: 95% (18 Jan 2021 11:00) (86% - 98%)    TESTS & MEASUREMENTS:                        10.0   10.86 )-----------( 257      ( 18 Jan 2021 04:50 )             31.4     01-18    141  |  110  |  50<H>  ----------------------------<  168<H>  4.0   |  23  |  1.3    Ca    8.5      18 Jan 2021 04:50  Mg     2.5     01-18    TPro  5.1<L>  /  Alb  2.7<L>  /  TBili  0.3  /  DBili  x   /  AST  25  /  ALT  12  /  AlkPhos  55  01-18    LIVER FUNCTIONS - ( 18 Jan 2021 04:50 )  Alb: 2.7 g/dL / Pro: 5.1 g/dL / ALK PHOS: 55 U/L / ALT: 12 U/L / AST: 25 U/L / GGT: x             Culture - Blood (collected 01-15-21 @ 04:30)  Source: .Blood Blood  Preliminary Report (01-16-21 @ 17:01):    No growth to date.    Culture - Urine (collected 01-14-21 @ 21:01)  Source: .Urine Clean Catch (Midstream)  Final Report (01-15-21 @ 21:59):    <10,000 CFU/mL Normal Urogenital Mis            RADIOLOGY & ADDITIONAL TESTS:  Personal review of radiological diagnostics performed  Echo and EKG results noted when applicable.     MEDICATIONS:  cefTRIAXone   IVPB 1000 milliGRAM(s) IV Intermittent every 24 hours  chlorhexidine 0.12% Liquid 15 milliLiter(s) Oral Mucosa two times a day  chlorhexidine 4% Liquid 1 Application(s) Topical <User Schedule>  cisatracurium Infusion 3 MICROgram(s)/kG/Min IV Continuous <Continuous>  dexAMETHasone  Injectable 6 milliGRAM(s) IV Push daily  enoxaparin Injectable 40 milliGRAM(s) SubCutaneous every 12 hours  fentaNYL    Injectable 25 MICROGram(s) IV Push once  fentaNYL   Infusion. 0.5 MICROgram(s)/kG/Hr IV Continuous <Continuous>  midazolam Infusion 0.02 mG/kG/Hr IV Continuous <Continuous>  norepinephrine Infusion 0.05 MICROgram(s)/kG/Min IV Continuous <Continuous>  pantoprazole   Suspension 40 milliGRAM(s) Oral daily  polyethylene glycol 3350 17 Gram(s) Oral daily  senna 2 Tablet(s) Oral at bedtime      ANTIBIOTICS:  cefTRIAXone   IVPB 1000 milliGRAM(s) IV Intermittent every 24 hours

## 2021-01-18 NOTE — PROGRESS NOTE ADULT - SUBJECTIVE AND OBJECTIVE BOX
Patient is a 75y old  Female who presents with a chief complaint of hypoxia, AMS (17 Jan 2021 13:07)        Over Night Events:  Remains on MV.  Sedated and parlayzed.  Off pressors         ROS:     All ROS are negative except HPI         PHYSICAL EXAM    ICU Vital Signs Last 24 Hrs  T(C): 36 (18 Jan 2021 04:00), Max: 36.2 (17 Jan 2021 12:00)  T(F): 96.8 (18 Jan 2021 04:00), Max: 97.2 (17 Jan 2021 20:00)  HR: 66 (18 Jan 2021 08:00) (52 - 70)  BP: --  BP(mean): --  ABP: 106/50 (18 Jan 2021 08:00) (88/46 - 120/54)  ABP(mean): 68 (18 Jan 2021 08:00) (62 - 76)  RR: 19 (18 Jan 2021 08:00) (3 - 38)  SpO2: 94% (18 Jan 2021 08:00) (86% - 99%)      CONSTITUTIONAL:  Ill Appearing   NAD    ENT:   Airway patent,   Mouth with normal mucosa.   No thrush    EYES:   Pupils equal,   Round and reactive to light.    CARDIAC:   Normal rate,   Regular rhythm.     edema      Vascular:  Normal systolic impulse  No Carotid bruits    RESPIRATORY:   No wheezing  Bilateral BS  Normal chest expansion  Not tachypneic,  No use of accessory muscles    GASTROINTESTINAL:  Abdomen soft,   Non-tender,   No guarding,   + BS    MUSCULOSKELETAL:   Range of motion is not limited,  No clubbing, cyanosis    NEUROLOGICAL:   Sedated and paralyzed     SKIN:   Skin normal color for race,   Warm and dry   No evidence of rash.    PSYCHIATRIC:   Sedated   No apparent risk to self or others.    HEMATOLOGICAL:  No cervical  lymphadenopathy.  no inguinal lymphadenopathy      01-17-21 @ 07:01  -  01-18-21 @ 07:00  --------------------------------------------------------  IN:    Cisatracurium: 89.5 mL    dextrose 5%: 1575 mL    FentaNYL: 506.7 mL    Lactated Ringers: 100 mL    Midazolam: 83.8 mL    Peptamen A.F.: 1000 mL  Total IN: 3355 mL    OUT:    Indwelling Catheter - Urethral (mL): 125 mL    Intermittent Catheterization - Urethral (mL): 225 mL  Total OUT: 350 mL    Total NET: 3005 mL      01-18-21 @ 07:01  -  01-18-21 @ 08:54  --------------------------------------------------------  IN:    Cisatracurium: 7.8 mL    dextrose 5%: 75 mL    FentaNYL: 19.5 mL    Midazolam: 3.3 mL  Total IN: 105.6 mL    OUT:    Voided (mL): 0 mL  Total OUT: 0 mL    Total NET: 105.6 mL          LABS:                            10.0   10.86 )-----------( 257      ( 18 Jan 2021 04:50 )             31.4                                               01-18    141  |  110  |  50<H>  ----------------------------<  168<H>  4.0   |  23  |  1.3    Ca    8.5      18 Jan 2021 04:50  Mg     2.5     01-18    TPro  5.1<L>  /  Alb  2.7<L>  /  TBili  0.3  /  DBili  x   /  AST  25  /  ALT  12  /  AlkPhos  55  01-18                                                                                           LIVER FUNCTIONS - ( 18 Jan 2021 04:50 )  Alb: 2.7 g/dL / Pro: 5.1 g/dL / ALK PHOS: 55 U/L / ALT: 12 U/L / AST: 25 U/L / GGT: x                                                                                               Mode: AC/ CMV (Assist Control/ Continuous Mandatory Ventilation)  RR (machine): 20  TV (machine): 400  FiO2: 100  PEEP: 15  ITime: 1  MAP: 22  PIP: 38                                      ABG - ( 18 Jan 2021 02:59 )  pH, Arterial: 7.31  pH, Blood: x     /  pCO2: 48    /  pO2: 67    / HCO3: 24    / Base Excess: -2.3  /  SaO2: 92    PPL PPL 32               MEDICATIONS  (STANDING):  cefTRIAXone   IVPB 1000 milliGRAM(s) IV Intermittent every 24 hours  chlorhexidine 0.12% Liquid 15 milliLiter(s) Oral Mucosa two times a day  chlorhexidine 4% Liquid 1 Application(s) Topical <User Schedule>  cisatracurium Infusion 3 MICROgram(s)/kG/Min (11.7 mL/Hr) IV Continuous <Continuous>  dexAMETHasone  Injectable 6 milliGRAM(s) IV Push daily  dextrose 5%. 1000 milliLiter(s) (75 mL/Hr) IV Continuous <Continuous>  enoxaparin Injectable 40 milliGRAM(s) SubCutaneous every 12 hours  fentaNYL    Injectable 25 MICROGram(s) IV Push once  fentaNYL   Infusion. 0.5 MICROgram(s)/kG/Hr (2.5 mL/Hr) IV Continuous <Continuous>  midazolam Infusion 0.02 mG/kG/Hr (1.3 mL/Hr) IV Continuous <Continuous>  norepinephrine Infusion 0.05 MICROgram(s)/kG/Min (4.69 mL/Hr) IV Continuous <Continuous>  pantoprazole   Suspension 40 milliGRAM(s) Oral daily    MEDICATIONS  (PRN):      New X-rays reviewed:                                                                                  ECHO    CXR interpreted by me:

## 2021-01-19 NOTE — PROGRESS NOTE ADULT - ASSESSMENT
76 yo Female with PMHx of HTN, diagnosed with Covid about 19 days ago, presents to ED for hypoxia and AMS    # Acute hypoxic respiratory failure, possible CAP causing AMS  # Hx of COVID PNA Day 19  - Initially on Bipap and was intubated on 01/15 for distress  -  f/u inflammatory markers  - Continue Meropenem  - s/p Rocephin and Azithro  - Continue Decadron  - ID following, recs:   Deep ET cultures  rocephin 2 gm iv q24h  Dexamethasone 6 mg iv q24h for 10 days   Monitor for side effects: hyperglycemia, neurological ( agitation/confusion), adrenal suppression, bacterial and fungal infections    #HTN   - Holding Valsartan  - Holding Atenolol    #DWAYNE- worsening   - creatinine 2 from 1.3   - s/p 80mg Lasix  - monitor creatinine   - start on IVF  - monitor UOP   - change Lovenox to subq heparin     Patient on Raloxifene. Will hold as would increase risk for VTE given her COVID status    Diet: NPO w/ tube feeds   DVT ppx: Heparin subq  GI ppx: Protonix 40 qd   Code full    76 yo Female with PMHx of HTN, diagnosed with Covid about 19 days ago, presents to ED for hypoxia and AMS    # Acute hypoxic respiratory failure, possible CAP causing AMS  # Hx of COVID PNA Day 19  - Initially on Bipap and was intubated on 01/15 for distress  -  f/u inflammatory markers  - Continue Meropenem  - s/p Rocephin and Azithro  - Continue Decadron  - ID following, recs:   Deep ET cultures  rocephin 2 gm iv q24h  Dexamethasone 6 mg iv q24h for 10 days   Monitor for side effects: hyperglycemia, neurological ( agitation/confusion), adrenal suppression, bacterial and fungal infections    #HTN   - Holding Valsartan  - Holding Atenolol    #DWAYNE- worsening   - creatinine 2 from 1.3   - s/p 80mg Lasix  - monitor creatinine   - start on IVF  - monitor UOP   - change Lovenox to subq heparin     Patient on Raloxifene. Will hold as would increase risk for VTE given her COVID status    Diet: NPO w/ tube feeds   DVT ppx: Heparin subq  GI ppx: Protonix 40 qd   Code full     Spoke with patient's daughter in law, [phone], updated her on patient's condition.

## 2021-01-19 NOTE — PHARMACOTHERAPY INTERVENTION NOTE - COMMENTS
-IVF D5W @ 75 ml/hr, Na 141, d/w team to evaluate, d/c D5W
-enoxaparin 40mg sc q12h, GFR now 24, recommended changing heparin 5000 units sc q8h
s/w md 2606- recommended change to 40mg qd since bmi <30
-plan increase enoxaparin 40mg sc q12h, recommended stat dose (~10am)

## 2021-01-19 NOTE — PROGRESS NOTE ADULT - ASSESSMENT
IMPRESSION:    Acute hypoxemic respiratory failure   severe COVID PNA  HTN  DWAYNE     PLAN:    CNS: Keep sedated and paralyzed.  CTH when more stable     HEENT: Oral care    PULMONARY:  HOB @ 45 degrees.  Monitor PPL and DP.   RR 30 PEEP 17.  Wean o2 as tolerated.  I:E 1:1       CARDIOVASCULAR: I = O. Avoid volume overload. Cheetah and GDE       GI: GI prophylaxis.  OG feeding.  Bowel Regimen     RENAL:  Follow up lytes.  Correct as needed.    INFECTIOUS DISEASE: Follow up cultures.  Repeat inflammatory markers, ABX Finish Course     HEMATOLOGICAL:  DVT prophylaxis. BID LMWH     ENDOCRINE:  Follow up FS.    MUSCULOSKELETAL: Bedrest    ICU monitoring    overall prognosis is poor

## 2021-01-19 NOTE — PROGRESS NOTE ADULT - SUBJECTIVE AND OBJECTIVE BOX
Patient is a 75y old  Female who presents with a chief complaint of hypoxia, AMS (18 Jan 2021 12:59)        Over Night Events:  Remains on MV.  On Levophed.  Sedated and paralysis          ROS:     All ROS are negative except HPI         PHYSICAL EXAM    ICU Vital Signs Last 24 Hrs  T(C): 36.1 (19 Jan 2021 08:00), Max: 36.2 (18 Jan 2021 16:00)  T(F): 96.9 (19 Jan 2021 08:00), Max: 97.1 (18 Jan 2021 16:00)  HR: 68 (19 Jan 2021 08:00) (52 - 68)  BP: --  BP(mean): --  ABP: 142/56 (19 Jan 2021 08:00) (82/48 - 142/56)  ABP(mean): 84 (19 Jan 2021 08:00) (60 - 84)  RR: 27 (19 Jan 2021 08:00) (5 - 28)  SpO2: 94% (19 Jan 2021 08:00) (91% - 97%)      CONSTITUTIONAL:  Well nourished.  NAD    ENT:   Airway patent,   Mouth with normal mucosa.   No thrush    EYES:   Pupils equal,   Round and reactive to light.    CARDIAC:   Normal rate,   Regular rhythm.     edema      Vascular:  Normal systolic impulse  No Carotid bruits    RESPIRATORY:   No wheezing  Bilateral BS  Normal chest expansion  Not tachypneic,  No use of accessory muscles    GASTROINTESTINAL:  Abdomen soft,   Non-tender,   No guarding,   + BS    MUSCULOSKELETAL:   Range of motion is not limited,  No clubbing, cyanosis    NEUROLOGICAL:   Sedated paralyzed     SKIN:   Skin normal color for race,   Warm and dry and intact.   No evidence of rash.    PSYCHIATRIC:   Sedated   No apparent risk to self or others.    HEMATOLOGICAL:  No cervical  lymphadenopathy.  no inguinal lymphadenopathy      01-18-21 @ 07:01  -  01-19-21 @ 07:00  --------------------------------------------------------  IN:    Cisatracurium: 177.6 mL    dextrose 5%: 75 mL    FentaNYL: 344.5 mL    Midazolam: 70.5 mL    Norepinephrine: 80 mL    Peptamen A.F.: 990 mL  Total IN: 1737.6 mL    OUT:    Indwelling Catheter - Urethral (mL): 493 mL    Voided (mL): 0 mL  Total OUT: 493 mL    Total NET: 1244.6 mL      01-19-21 @ 07:01  -  01-19-21 @ 09:06  --------------------------------------------------------  IN:    Cisatracurium: 14 mL    FentaNYL: 26 mL    Midazolam: 6 mL    Norepinephrine: 20 mL  Total IN: 66 mL    OUT:    Indwelling Catheter - Urethral (mL): 30 mL  Total OUT: 30 mL    Total NET: 36 mL          LABS:                            10.9   18.74 )-----------( 333      ( 19 Jan 2021 05:35 )             33.6                                               01-19    138  |  108  |  62<HH>  ----------------------------<  164<H>  4.4   |  19  |  2.0<H>    Creatinine Trend  BUN 62, Cr 2.0, (01-19-21 @ 05:35)  Creatinine Trend  BUN 50, Cr 1.3, (01-18-21 @ 04:50)  Creatinine Trend  BUN 49, Cr 1.3, (01-17-21 @ 16:29)  Creatinine Trend  BUN 43, Cr 1.2, (01-17-21 @ 04:30)  Creatinine Trend  BUN 39, Cr 1.5, (01-16-21 @ 05:30)  Creatinine Trend  BUN 35, Cr 1.9, (01-15-21 @ 11:06)  Creatinine Trend  BUN 24, Cr 1.0, (01-14-21 @ 20:00)      Ca    8.7      19 Jan 2021 05:35  Mg     2.6     01-19    TPro  5.3<L>  /  Alb  2.6<L>  /  TBili  0.3  /  DBili  x   /  AST  40  /  ALT  20  /  AlkPhos  80  01-19                                                                                           LIVER FUNCTIONS - ( 19 Jan 2021 05:35 )  Alb: 2.6 g/dL / Pro: 5.3 g/dL / ALK PHOS: 80 U/L / ALT: 20 U/L / AST: 40 U/L / GGT: x                                                                                               Mode: AC/ CMV (Assist Control/ Continuous Mandatory Ventilation)  RR (machine): 28  TV (machine): 350  FiO2: 100  PEEP: 15  ITime: 1  MAP: 23  PIP: 35                                      ABG - ( 19 Jan 2021 02:58 )  pH, Arterial: 7.31  pH, Blood: x     /  pCO2: 43    /  pO2: 60    / HCO3: 22    / Base Excess: -4.6  /  SaO2: 90    PPL 29               MEDICATIONS  (STANDING):  cefTRIAXone   IVPB 2000 milliGRAM(s) IV Intermittent every 24 hours  chlorhexidine 0.12% Liquid 15 milliLiter(s) Oral Mucosa two times a day  chlorhexidine 4% Liquid 1 Application(s) Topical <User Schedule>  cisatracurium Infusion 3 MICROgram(s)/kG/Min (11.7 mL/Hr) IV Continuous <Continuous>  dexAMETHasone  Injectable 6 milliGRAM(s) IV Push daily  enoxaparin Injectable 40 milliGRAM(s) SubCutaneous every 12 hours  fentaNYL    Injectable 25 MICROGram(s) IV Push once  fentaNYL   Infusion. 0.5 MICROgram(s)/kG/Hr (2.5 mL/Hr) IV Continuous <Continuous>  midazolam Infusion 0.02 mG/kG/Hr (1.3 mL/Hr) IV Continuous <Continuous>  norepinephrine Infusion 0.05 MICROgram(s)/kG/Min (4.69 mL/Hr) IV Continuous <Continuous>  pantoprazole   Suspension 40 milliGRAM(s) Oral daily  polyethylene glycol 3350 17 Gram(s) Oral daily  senna 2 Tablet(s) Oral at bedtime    MEDICATIONS  (PRN):      New X-rays reviewed:                                                                                  ECHO    CXR interpreted by me:  ET OK OG OK>  Bilateral infiltrates

## 2021-01-19 NOTE — PHARMACOTHERAPY INTERVENTION NOTE - INTERVENTION TYPE RECOOMEND
Therapy Recommended - Med Rec related
Timing/Frequency of Administration Recommended
Therapy Recommended - Alternative treatment
Timing/Frequency of Administration Recommended

## 2021-01-19 NOTE — PROGRESS NOTE ADULT - SUBJECTIVE AND OBJECTIVE BOX
SUBJECTIVE:    Patient is a 75y old Female who presents with a chief complaint of hypoxia, AMS (19 Jan 2021 09:05)    Currently admitted to medicine with the primary diagnosis of Hypoxia       Today is hospital day 6d. No acute events overnight.       PAST MEDICAL & SURGICAL HISTORY  Hypertension      SOCIAL HISTORY:  Negative for smoking/alcohol/drug use.     ALLERGIES:  No Known Allergies    MEDICATIONS:  STANDING MEDICATIONS  cefTRIAXone   IVPB 2000 milliGRAM(s) IV Intermittent every 24 hours  chlorhexidine 0.12% Liquid 15 milliLiter(s) Oral Mucosa two times a day  chlorhexidine 4% Liquid 1 Application(s) Topical <User Schedule>  cisatracurium Infusion 3 MICROgram(s)/kG/Min IV Continuous <Continuous>  dexAMETHasone  Injectable 6 milliGRAM(s) IV Push daily  fentaNYL    Injectable 25 MICROGram(s) IV Push once  fentaNYL   Infusion. 0.5 MICROgram(s)/kG/Hr IV Continuous <Continuous>  heparin   Injectable 5000 Unit(s) SubCutaneous every 8 hours  lactated ringers Bolus 500 milliLiter(s) IV Bolus once  midazolam Infusion 0.02 mG/kG/Hr IV Continuous <Continuous>  norepinephrine Infusion 0.05 MICROgram(s)/kG/Min IV Continuous <Continuous>  pantoprazole   Suspension 40 milliGRAM(s) Oral daily  polyethylene glycol 3350 17 Gram(s) Oral daily  senna 2 Tablet(s) Oral at bedtime    PRN MEDICATIONS    VITALS:   T(F): 96.7  HR: 53  BP: 95/59  RR: 28  SpO2: 98%    PHYSICAL EXAM:  GEN: Intubated, sedated   LUNGS: Decreased breath sounds bilaterally   HEART: S1/S2 present.   ABD: Soft, non-distended.  EXT: No edema   NEURO: sedated, non focal     Barrett catheter present     LABS:                        10.9   18.74 )-----------( 333      ( 19 Jan 2021 05:35 )             33.6     01-19    138  |  108  |  62<HH>  ----------------------------<  164<H>  4.4   |  19  |  2.0<H>    Ca    8.7      19 Jan 2021 05:35  Mg     2.6     01-19    TPro  5.3<L>  /  Alb  2.6<L>  /  TBili  0.3  /  DBili  x   /  AST  40  /  ALT  20  /  AlkPhos  80  01-19        ABG - ( 19 Jan 2021 02:58 )  pH, Arterial: 7.31  pH, Blood: x     /  pCO2: 43    /  pO2: 60    / HCO3: 22    / Base Excess: -4.6  /  SaO2: 90        RADIOLOGY:  EXAM:  XR CHEST PORTABLE ROUTINE 1V          PROCEDURE DATE:  01/19/2021      INTERPRETATION:  CLINICAL HISTORY: covid pna.    COMPARISON: 1/17/2021.    TECHNIQUE: Portable frontal chest radiograph. Adequate positioning.    FINDINGS:    Support devices: Stable right IJ line endotracheal tube and enteric tube.    Cardiac/mediastinum/hilum: Unremarkable.    Lung parenchyma/Pleura: No change in bilateral interstitial and airspace opacities right greater than left. No pneumothorax.    Skeleton/soft tissues: Stable.      IMPRESSION:    No significant change since 1/17/2021.    JAZMYNE VIVEROS MD; Attending Radiologist  This document has been electronically signed. Jan 19 2021 11:44AM

## 2021-01-20 NOTE — PROGRESS NOTE ADULT - SUBJECTIVE AND OBJECTIVE BOX
ANTOINETTE FENG  75y, Female    All available historical data reviewed    OVERNIGHT EVENTS:  no fevers  paralysed  on pressors  fio2 100%  no diarrhea    ROS:  unable to obtain history secondary to patient's mental status and/or sedation    VITALS:  T(F): 95.6, Max: 97.1 (01-19-21 @ 16:00)  HR: 72  BP: 86/47  RR: 15Vital Signs Last 24 Hrs  T(C): 35.3 (20 Jan 2021 12:00), Max: 36.2 (19 Jan 2021 16:00)  T(F): 95.6 (20 Jan 2021 12:00), Max: 97.1 (19 Jan 2021 16:00)  HR: 72 (20 Jan 2021 12:00) (52 - 80)  BP: 86/47 (20 Jan 2021 00:45) (86/47 - 86/47)  BP(mean): 60 (20 Jan 2021 00:45) (60 - 60)  RR: 15 (20 Jan 2021 12:00) (0 - 30)  SpO2: 96% (20 Jan 2021 12:00) (92% - 99%)    TESTS & MEASUREMENTS:                        10.4   16.04 )-----------( 313      ( 20 Jan 2021 05:40 )             32.4     01-20    138  |  107  |  67<HH>  ----------------------------<  152<H>  5.3<H>   |  21  |  2.0<H>    Ca    9.1      20 Jan 2021 05:40  Phos  4.3     01-20  Mg     2.6     01-20    TPro  5.2<L>  /  Alb  2.4<L>  /  TBili  <0.2  /  DBili  x   /  AST  35  /  ALT  27  /  AlkPhos  89  01-20    LIVER FUNCTIONS - ( 20 Jan 2021 05:40 )  Alb: 2.4 g/dL / Pro: 5.2 g/dL / ALK PHOS: 89 U/L / ALT: 27 U/L / AST: 35 U/L / GGT: x             Culture - Blood (collected 01-15-21 @ 04:30)  Source: .Blood Blood  Preliminary Report (01-16-21 @ 17:01):    No growth to date.    Culture - Urine (collected 01-14-21 @ 21:01)  Source: .Urine Clean Catch (Midstream)  Final Report (01-15-21 @ 21:59):    <10,000 CFU/mL Normal Urogenital Mis            RADIOLOGY & ADDITIONAL TESTS:  Personal review of radiological diagnostics performed  Echo and EKG results noted when applicable.     MEDICATIONS:  cefTRIAXone   IVPB 2000 milliGRAM(s) IV Intermittent every 24 hours  chlorhexidine 0.12% Liquid 15 milliLiter(s) Oral Mucosa two times a day  chlorhexidine 4% Liquid 1 Application(s) Topical <User Schedule>  cisatracurium Infusion 3 MICROgram(s)/kG/Min IV Continuous <Continuous>  dexAMETHasone  Injectable 6 milliGRAM(s) IV Push daily  fentaNYL    Injectable 25 MICROGram(s) IV Push once  fentaNYL   Infusion. 0.5 MICROgram(s)/kG/Hr IV Continuous <Continuous>  heparin   Injectable 5000 Unit(s) SubCutaneous every 8 hours  midazolam Infusion 0.02 mG/kG/Hr IV Continuous <Continuous>  norepinephrine Infusion 0.05 MICROgram(s)/kG/Min IV Continuous <Continuous>  pantoprazole   Suspension 40 milliGRAM(s) Oral daily  polyethylene glycol 3350 17 Gram(s) Oral daily  senna 2 Tablet(s) Oral at bedtime      ANTIBIOTICS:  cefTRIAXone   IVPB 2000 milliGRAM(s) IV Intermittent every 24 hours

## 2021-01-20 NOTE — PROGRESS NOTE ADULT - ASSESSMENT
· Assessment	  74 yo F, healthy other than hx of HTN, diagnosed with Covid about 19 days ago, presents to ED for hypoxia and AMS    IMPRESSION;  COVID 19 with critical illness. MV or end organ damage as seen in sepsis/septic shock.   ARDS with Fio2 100%  MSOF  renal function: worsening  Pt is in the late inflammatory response phase ot the illness based on the onset of symptoms  procalcitonin 0.38    Ferritin 836  CRP 8.34>9.87  Ddimers 102>274>574  Blood & Urine cxs NGTD 1/14  Jeanne SERNA NG  legionella antigen NG    RECOMMENDATIONS;  Deep ET cultures  rocephin 2 gm iv q24h  Dexamethasone 6 mg iv q24h for 10 days   Monitor for side effects: hyperglycemia, neurological ( agitation/confusion), adrenal suppression, bacterial and fungal infections

## 2021-01-20 NOTE — PROGRESS NOTE ADULT - SUBJECTIVE AND OBJECTIVE BOX
Patient is a 75y old  Female who presents with a chief complaint of hypoxia, AMS (19 Jan 2021 14:06)        Over Night Events:  remains on MV.  Sedated and paralyzed.          ROS:     All pertinent ROS are negative except HPI         PHYSICAL EXAM    ICU Vital Signs Last 24 Hrs  T(C): 35.3 (20 Jan 2021 08:00), Max: 36.2 (19 Jan 2021 16:00)  T(F): 95.5 (20 Jan 2021 08:00), Max: 97.1 (19 Jan 2021 16:00)  HR: 62 (20 Jan 2021 09:00) (52 - 80)  BP: 86/47 (20 Jan 2021 00:45) (86/47 - 95/59)  BP(mean): 60 (20 Jan 2021 00:45) (60 - 68)  ABP: 116/56 (20 Jan 2021 09:00) (86/42 - 154/62)  ABP(mean): 78 (20 Jan 2021 09:00) (58 - 96)  RR: 30 (20 Jan 2021 09:00) (0 - 30)  SpO2: 96% (20 Jan 2021 09:00) (91% - 99%)      CONSTITUTIONAL:   Ill appearing.  Well nourished.  NAD    ENT:   Airway patent,   Mouth with normal mucosa.   No thrush    EYES:   Pupils equal,   Round and reactive to light.    CARDIAC:   Normal rate,   Regular rhythm.     edema      Vascular:  Normal systolic impulse  No Carotid bruits    RESPIRATORY:   No wheezing  Bilateral BS  Normal chest expansion  Not tachypneic,  No use of accessory muscles    GASTROINTESTINAL:  Abdomen soft,   Non-tender,   No guarding,   + BS    GENITOURINARY  normal genitalia for sex  no edema    MUSCULOSKELETAL:   Range of motion is not limited,  No clubbing, cyanosis    NEUROLOGICAL:   Sedated  Paralyzed     SKIN:   Skin normal color for race,   Warm and dry  No evidence of rash.    PSYCHIATRIC:   Sedated   No apparent risk to self or others.    HEMATOLOGICAL:  No cervical  lymphadenopathy.  no inguinal lymphadenopathy      01-19-21 @ 07:01  -  01-20-21 @ 07:00  --------------------------------------------------------  IN:    Cisatracurium: 191.4 mL    Enteral Tube Flush: 240 mL    FentaNYL: 202 mL    Lactated Ringers Bolus: 1750 mL    Midazolam: 56 mL    Norepinephrine: 121.6 mL    Peptamen A.F.: 1000 mL  Total IN: 3561 mL    OUT:    Indwelling Catheter - Urethral (mL): 710 mL  Total OUT: 710 mL    Total NET: 2851 mL      01-20-21 @ 07:01  -  01-20-21 @ 09:44  --------------------------------------------------------  IN:    Cisatracurium: 23.4 mL    Enteral Tube Flush: 50 mL    FentaNYL: 29.3 mL    IV PiggyBack: 50 mL    Midazolam: 6 mL    Norepinephrine: 6 mL  Total IN: 164.7 mL    OUT:    Indwelling Catheter - Urethral (mL): 60 mL  Total OUT: 60 mL    Total NET: 104.7 mL          LABS:                            10.4   16.04 )-----------( 313      ( 20 Jan 2021 05:40 )             32.4                                               01-20    138  |  107  |  67<HH>  ----------------------------<  152<H>  5.3<H>   |  21  |  2.0<H>    20 Jan 2021 05:40    138    |  107    |  67<HH>  ----------------------------<  152<H>  5.3<H>   |  21     |  2.0<H>  19 Jan 2021 05:35    138    |  108    |  62<HH>  ----------------------------<  164<H>  4.4     |  19     |  2.0<H>    Ca    9.1        20 Jan 2021 05:40  Ca    8.7        19 Jan 2021 05:35  Phos  4.3       20 Jan 2021 05:40  Mg     2.6<H>     20 Jan 2021 05:40  Mg     2.6<H>     19 Jan 2021 05:35    TPro  5.2<L>  /  Alb  2.4<L>  /  TBili  <0.2   /  DBili  x      /  AST  35     /  ALT  27     /  AlkPhos  89     20 Jan 2021 05:40  TPro  5.3<L>  /  Alb  2.6<L>  /  TBili  0.3    /  DBili  x      /  AST  40     /  ALT  20     /  AlkPhos  80     19 Jan 2021 05:35      Ca    9.1      20 Jan 2021 05:40  Phos  4.3     01-20  Mg     2.6     01-20    TPro  5.2<L>  /  Alb  2.4<L>  /  TBili  <0.2  /  DBili  x   /  AST  35  /  ALT  27  /  AlkPhos  89  01-20                                                                                           LIVER FUNCTIONS - ( 20 Jan 2021 05:40 )  Alb: 2.4 g/dL / Pro: 5.2 g/dL / ALK PHOS: 89 U/L / ALT: 27 U/L / AST: 35 U/L / GGT: x                                                                                               Mode: AC/ CMV (Assist Control/ Continuous Mandatory Ventilation)  RR (machine): 30  TV (machine): 330  FiO2: 100  PEEP: 17  ITime: 1  MAP: 26  PIP: 37                                      ABG - ( 20 Jan 2021 01:35 )  pH, Arterial: 7.24  pH, Blood: x     /  pCO2: 52    /  pO2: 72    / HCO3: 23    / Base Excess: -5.3  /  SaO2: 92    PPL 32               MEDICATIONS  (STANDING):  cefTRIAXone   IVPB 2000 milliGRAM(s) IV Intermittent every 24 hours  chlorhexidine 0.12% Liquid 15 milliLiter(s) Oral Mucosa two times a day  chlorhexidine 4% Liquid 1 Application(s) Topical <User Schedule>  cisatracurium Infusion 3 MICROgram(s)/kG/Min (11.7 mL/Hr) IV Continuous <Continuous>  dexAMETHasone  Injectable 6 milliGRAM(s) IV Push daily  fentaNYL    Injectable 25 MICROGram(s) IV Push once  fentaNYL   Infusion. 0.5 MICROgram(s)/kG/Hr (2.5 mL/Hr) IV Continuous <Continuous>  heparin   Injectable 5000 Unit(s) SubCutaneous every 8 hours  midazolam Infusion 0.02 mG/kG/Hr (1.3 mL/Hr) IV Continuous <Continuous>  norepinephrine Infusion 0.05 MICROgram(s)/kG/Min (4.69 mL/Hr) IV Continuous <Continuous>  pantoprazole   Suspension 40 milliGRAM(s) Oral daily  polyethylene glycol 3350 17 Gram(s) Oral daily  senna 2 Tablet(s) Oral at bedtime    MEDICATIONS  (PRN):      New X-rays reviewed:                                                                                  ECHO    CXR interpreted by me:  ET OG OK>  Bilateral infiltrates

## 2021-01-20 NOTE — CHART NOTE - NSCHARTNOTEFT_GEN_A_CORE
Registered Dietitian Follow-Up    ***Scroll to the bottom for RD recommendation***    Patient Profile Reviewed                           Yes [x]   No []  Nutrition History Previously Obtained        Yes [x]  No []          PERTINENT SUBJECTIVE INFORMATION:  - pt remains intubated to ventilator,   - on NIMBEX, versed, fentanyl, and pressor  - tube feeding bolus, RN reported no issue with it.  - been on this TF since 3 days ago, last RD recs not taken x1.  - Ve 10.2, Temp 35.6, /60, MAP 82.          PERTINENT MEDICAL INFORMATIONS:  (1) Acute hypoxic resp failure poss CAP causing AMS.  (2) F/u inflammatory marker, c/w abx, decadron  (3) ID following. DWAYNE worsening  (4) s/p lasix. starting IVF.   (5) NPO on tube feed          DIET ORDER:   PEPTAMEN AF at 250ml q6hr (OG) = 1200 kcal/ 76g protein           ANTHROPOMETRICS:  - Ht.  162.6cm  - Wt.   (1/15): 65kg  (1/19): 67.9kg  - edema noted, will continue monitor wt trend  - BMI. 24.6  - IBW       PERTINENT LAB DATA:  1/20: h/h 10.4/32.4, K 5.3, BUN 67, Cr 2.0, Glucose 152, Albumin 2.4, GFR 34, Mag 2.6  PERTINENT MEDS:   heparin, abx, senna, miralax, nimbex, protonix, versed, fentanyl, decadron       PHYSICAL FINDINGS  - APPEARANCE:       intubated to ventilator, 1+ b/l hand edema  - GI FUNCTION:        LBM 1/19 per EMR  - TUBES:                     OG  - ORAL/MOUTH:      NPO  - SKIN:                        Ecchymosis        NUTRITION REQUIREMENTS  WEIGHT USED:                          ABW: 65kg  ESTIMATED ENERGY NEEDS:       CONTINUE [  ]      ADJUST [ x ] - as of 1/20    ESTIMATED ENERGY NEEDS:         1211 kcal/day ([id number]) with low body temp v.s. ~1461 kcal/day ([id number]) with normal body temp  ESTIMATED PROTEIN NEEDS:        78-97g/day (1.2-1.5 g/kg of ABW) renal status monitoring  ESTIMATED FLUID NEEDS:             per ICU team    CURRENT NUTRIENT NEEDS:      1200 kcal/ 76g protein = meeting low end          [ x ] PREVIOUS NUTRITION DIAGNOSIS:    (1) inadequate protein-energy intake            [ x ] ONGOING        [  ] RESOLVED          PATIENT INTERVENTION:    [  ] ORAL        [x ] EN/TF     GOAL/EXPECTED OUTCOME:     pt to meet and tolerate >85% of estimated kcal/protein via TF upon f/u in 3 days. Pt to have 1BM/day.  INDICATOR/MONITORING:       RD to monitor diet order, energy intake, body composition, nutrition focused physical findings (EN tolerance, BM)  NUTRITION INTERVENTION:        Enteral nutrition      RECS: (1)   Same recs as previously ---> adjust slightly on current tube feed regimen by ADDING 2 BENEPROTEIN PACKET PER DAY, and considering current status, pt would benefit from continuous feeding of 40cc/hr. This regimen with beneprotein gives a total of 1280 kcal/ 88g protein/ 1600mg K/ 777mL free water, additional flushes per ICU team.

## 2021-01-20 NOTE — PROGRESS NOTE ADULT - ASSESSMENT
74 yo Female with PMHx of HTN, diagnosed with Covid about 19 days ago, presents to ED for hypoxia and AMS    # Acute hypoxic respiratory failure, possible CAP causing AMS  # Hx of COVID PNA Day 19  - Initially on Bipap and was intubated on 01/15 for distress  - f/u inflammatory markers  - C/w Rocephin   - C/w Decadron, day #8   - ID following, recs:   Deep ET cultures  rocephin 2 gm iv q24h  Dexamethasone 6 mg iv q24h for 10 days   Monitor for side effects: hyperglycemia, neurological ( agitation/confusion), adrenal suppression, bacterial and fungal infections    #HTN   - Holding Valsartan  - Holding Atenolol  - wean off levo   - monitor     #DWAYNE- worsening   - creatinine 2 from 1.3   - s/p 80mg Lasix  - monitor creatinine   - started on IVF, positive cheetah, d/c levo   - monitor UOP   - c/w subq heparin     Patient on Raloxifene. Will hold as would increase risk for VTE given her COVID status    Diet: NPO w/ tube feeds   DVT ppx: Heparin subq  GI ppx: Protonix 40 qd   Code full    76 yo Female with PMHx of HTN, diagnosed with Covid about 19 days ago, presents to ED for hypoxia and AMS    # Acute hypoxic respiratory failure, possible CAP causing AMS  # Hx of COVID PNA Day 19  - Initially on Bipap and was intubated on 01/15 for distress  - f/u inflammatory markers  - C/w Rocephin   - C/w Decadron, day #8   - ID following, recs:   Deep ET cultures  rocephin 2 gm iv q24h  Dexamethasone 6 mg iv q24h for 10 days   Monitor for side effects: hyperglycemia, neurological ( agitation/confusion), adrenal suppression, bacterial and fungal infections    #HTN   - Holding Valsartan  - Holding Atenolol  - wean off levo   - monitor     #DWAYNE- worsening   - creatinine 2 from 1.3   - s/p 80mg Lasix  - monitor creatinine   - started on IVF, positive cheetah, d/c levo   - monitor UOP   - c/w subq heparin     Patient on Raloxifene. Will hold as would increase risk for VTE given her COVID status    Diet: NPO w/ tube feeds   DVT ppx: Heparin subq  GI ppx: Protonix 40 qd   Code full     Spoke with patient's , Bryn, [phone], updated him on patient's condition.

## 2021-01-20 NOTE — PROGRESS NOTE ADULT - SUBJECTIVE AND OBJECTIVE BOX
SUBJECTIVE:    Patient is a 75y old Female who presents with a chief complaint of hypoxia, AMS (20 Jan 2021 09:44)    Currently admitted to medicine with the primary diagnosis of Hypoxia       Today is hospital day 7d. No acute events overnight.       PAST MEDICAL & SURGICAL HISTORY  Hypertension      SOCIAL HISTORY:  Negative for smoking/alcohol/drug use.     ALLERGIES:  No Known Allergies    MEDICATIONS:  STANDING MEDICATIONS  cefTRIAXone   IVPB 2000 milliGRAM(s) IV Intermittent every 24 hours  chlorhexidine 0.12% Liquid 15 milliLiter(s) Oral Mucosa two times a day  chlorhexidine 4% Liquid 1 Application(s) Topical <User Schedule>  cisatracurium Infusion 3 MICROgram(s)/kG/Min IV Continuous <Continuous>  dexAMETHasone  Injectable 6 milliGRAM(s) IV Push daily  fentaNYL    Injectable 25 MICROGram(s) IV Push once  fentaNYL   Infusion. 0.5 MICROgram(s)/kG/Hr IV Continuous <Continuous>  heparin   Injectable 5000 Unit(s) SubCutaneous every 8 hours  midazolam Infusion 0.02 mG/kG/Hr IV Continuous <Continuous>  norepinephrine Infusion 0.05 MICROgram(s)/kG/Min IV Continuous <Continuous>  pantoprazole   Suspension 40 milliGRAM(s) Oral daily  polyethylene glycol 3350 17 Gram(s) Oral daily  senna 2 Tablet(s) Oral at bedtime  sodium zirconium cyclosilicate 10 Gram(s) Oral once    PRN MEDICATIONS    VITALS:   T(F): 95.5  HR: 62  BP: 86/47  RR: 30  SpO2: 96%    PHYSICAL EXAM:  GEN: Intubated, sedated   LUNGS: Decreased breath sounds bilaterally   HEART: S1/S2 present.   ABD: Soft, non-distended.  EXT: No edema   NEURO: sedated, on paralytic, non focal     Barrett catheter present     LABS:                        10.4   16.04 )-----------( 313      ( 20 Jan 2021 05:40 )             32.4     01-20    138  |  107  |  67<HH>  ----------------------------<  152<H>  5.3<H>   |  21  |  2.0<H>    Ca    9.1      20 Jan 2021 05:40  Phos  4.3     01-20  Mg     2.6     01-20    TPro  5.2<L>  /  Alb  2.4<L>  /  TBili  <0.2  /  DBili  x   /  AST  35  /  ALT  27  /  AlkPhos  89  01-20        ABG - ( 20 Jan 2021 01:35 )  pH, Arterial: 7.24  pH, Blood: x     /  pCO2: 52    /  pO2: 72    / HCO3: 23    / Base Excess: -5.3  /  SaO2: 92        RADIOLOGY:  EXAM:  XR CHEST PORTABLE ROUTINE 1V          PROCEDURE DATE:  01/19/2021      INTERPRETATION:  CLINICAL HISTORY: covid pna.    COMPARISON: 1/17/2021.    TECHNIQUE: Portable frontal chest radiograph. Adequate positioning.    FINDINGS:    Support devices: Stable right IJ line endotracheal tube and enteric tube.    Cardiac/mediastinum/hilum: Unremarkable.    Lung parenchyma/Pleura: No change in bilateral interstitial and airspace opacities right greater than left. No pneumothorax.    Skeleton/soft tissues: Stable.    IMPRESSION:    No significant change since 1/17/2021.    JAZMYNE VIVEROS MD; Attending Radiologist  This document has been electronically signed. Jan 19 2021 11:44AM

## 2021-01-20 NOTE — PROGRESS NOTE ADULT - ASSESSMENT
IMPRESSION:    Acute hypoxemic respiratory failure   severe COVID PNA  HTN  DWAYNE     PLAN:    CNS: Keep sedated and paralyzed.  CTH when more stable     HEENT: Oral care    PULMONARY:  HOB @ 45 degrees.  Monitor PPL and DP.  Wean o2 as tolerated.      CARDIOVASCULAR: I = O. Avoid volume overload.        GI: GI prophylaxis.  OG feeding.  Bowel Regimen     RENAL:  Follow up lytes.  Correct as needed.    INFECTIOUS DISEASE: Follow up cultures.  Repeat inflammatory markers, ABX Finish Course     HEMATOLOGICAL:  DVT prophylaxis. BID LMWH     ENDOCRINE:  Follow up FS.    MUSCULOSKELETAL: Bedrest    ICU monitoring    overall prognosis is poor

## 2021-01-21 NOTE — PROVIDER CONTACT NOTE (EICU) - SITUATION
eICU intervention
Nimbex D/C for 45 minutes. Pt 02 dropped to 84%. Vent settings 280/34/100%/16
Pt had nimbex push and 02 continues to drop to 77%

## 2021-01-21 NOTE — PROGRESS NOTE ADULT - ASSESSMENT
· Assessment	  76 yo F, healthy other than hx of HTN, diagnosed with Covid about 19 days ago, presents to ED for hypoxia and AMS    IMPRESSION;  COVID 19 with critical illness. MV or end organ damage as seen in sepsis/septic shock.   ARDS with Fio2 100%  MSOF  renal function: worsening  Pt is in the late inflammatory response phase ot the illness based on the onset of symptoms  procalcitonin 0.38    Ferritin 836  CRP 8.34>9.87  Ddimers 102>274>574  Blood & Urine cxs NGTD 1/14  Nares ORSA NG  legionella antigen NG    RECOMMENDATIONS;  NO NEED TO REPEAT INFLAMMATORY MARKERS.   Deep ET cultures  rocephin 2 gm iv q24h  Dexamethasone 6 mg iv q24h for 10 days   Monitor for side effects: hyperglycemia, neurological ( agitation/confusion), adrenal suppression, bacterial and fungal infections

## 2021-01-21 NOTE — PROGRESS NOTE ADULT - SUBJECTIVE AND OBJECTIVE BOX
ESTELLEGERBERCARLYSTEPHANTOINETTE  75y, Female    All available historical data reviewed    OVERNIGHT EVENTS:  no fevers  fio2 100%  paralysed  off pressors    ROS:  unable to obtain history secondary to patient's mental status and/or sedation     VITALS:  T(F): 97.6, Max: 97.6 (01-21-21 @ 08:00)  HR: 84  BP: --  RR: 22Vital Signs Last 24 Hrs  T(C): 36.4 (21 Jan 2021 08:00), Max: 36.4 (21 Jan 2021 08:00)  T(F): 97.6 (21 Jan 2021 08:00), Max: 97.6 (21 Jan 2021 08:00)  HR: 84 (21 Jan 2021 10:00) (66 - 96)  BP: --  BP(mean): --  RR: 22 (21 Jan 2021 10:00) (0 - 30)  SpO2: 96% (21 Jan 2021 10:00) (94% - 98%)    TESTS & MEASUREMENTS:                        10.2   5.67  )-----------( 294      ( 21 Jan 2021 04:50 )             32.5     01-21    136  |  107  |  81<HH>  ----------------------------<  186<H>  5.1<H>   |  21  |  2.0<H>    Ca    8.9      21 Jan 2021 04:50  Phos  4.3     01-20  Mg     2.7     01-21    TPro  5.2<L>  /  Alb  2.4<L>  /  TBili  <0.2  /  DBili  x   /  AST  42<H>  /  ALT  53<H>  /  AlkPhos  101  01-21    LIVER FUNCTIONS - ( 21 Jan 2021 04:50 )  Alb: 2.4 g/dL / Pro: 5.2 g/dL / ALK PHOS: 101 U/L / ALT: 53 U/L / AST: 42 U/L / GGT: x             Culture - Blood (collected 01-15-21 @ 04:30)  Source: .Blood None  Final Report (01-20-21 @ 17:01):    No Growth Final    Culture - Urine (collected 01-14-21 @ 21:01)  Source: .Urine Clean Catch (Midstream)  Final Report (01-15-21 @ 21:59):    <10,000 CFU/mL Normal Urogenital Mis            RADIOLOGY & ADDITIONAL TESTS:  Personal review of radiological diagnostics performed  Echo and EKG results noted when applicable.     MEDICATIONS:  cefTRIAXone   IVPB 2000 milliGRAM(s) IV Intermittent every 24 hours  chlorhexidine 0.12% Liquid 15 milliLiter(s) Oral Mucosa two times a day  chlorhexidine 4% Liquid 1 Application(s) Topical <User Schedule>  cisatracurium Injectable 10 milliGRAM(s) IV Push once  dexAMETHasone  Injectable 6 milliGRAM(s) IV Push daily  fentaNYL    Injectable 25 MICROGram(s) IV Push once  fentaNYL   Infusion. 0.5 MICROgram(s)/kG/Hr IV Continuous <Continuous>  heparin   Injectable 5000 Unit(s) SubCutaneous every 8 hours  midazolam Infusion 0.02 mG/kG/Hr IV Continuous <Continuous>  norepinephrine Infusion 0.05 MICROgram(s)/kG/Min IV Continuous <Continuous>  pantoprazole   Suspension 40 milliGRAM(s) Oral daily  polyethylene glycol 3350 17 Gram(s) Oral daily  senna 2 Tablet(s) Oral at bedtime  sodium bicarbonate  Infusion 0.115 mEq/kG/Hr IV Continuous <Continuous>  sodium zirconium cyclosilicate 10 Gram(s) Oral every 8 hours      ANTIBIOTICS:  cefTRIAXone   IVPB 2000 milliGRAM(s) IV Intermittent every 24 hours

## 2021-01-21 NOTE — PROGRESS NOTE ADULT - SUBJECTIVE AND OBJECTIVE BOX
Patient is a 75y old  Female who presents with a chief complaint of hypoxia, AMS (20 Jan 2021 13:06)        Over Night Events:  Remains on MV.  Sedated paralyzed.         ROS:     All ROS are negative except HPI         PHYSICAL EXAM    ICU Vital Signs Last 24 Hrs  T(C): 36.4 (21 Jan 2021 08:00), Max: 36.4 (21 Jan 2021 08:00)  T(F): 97.6 (21 Jan 2021 08:00), Max: 97.6 (21 Jan 2021 08:00)  HR: 82 (21 Jan 2021 08:00) (64 - 96)  BP: --  BP(mean): --  ABP: 110/50 (21 Jan 2021 08:00) (100/54 - 152/68)  ABP(mean): 70 (21 Jan 2021 08:00) (68 - 94)  RR: 30 (21 Jan 2021 08:00) (0 - 30)  SpO2: 96% (21 Jan 2021 08:00) (94% - 98%)      CONSTITUTIONAL:  Well nourished. Ill appearing  NAD    ENT:   Airway patent,   Mouth with normal mucosa.   No thrush    EYES:   Pupils equal,   Round and reactive to light.    CARDIAC:   Normal rate,   Regular rhythm.     edema      Vascular:  Normal systolic impulse  No Carotid bruits    RESPIRATORY:   No wheezing  Bilateral BS  Normal chest expansion  Not tachypneic,  No use of accessory muscles    GASTROINTESTINAL:  Abdomen soft,   Non-tender,   No guarding,   + BS    MUSCULOSKELETAL:   Range of motion is not limited,  No clubbing, cyanosis    NEUROLOGICAL:   Sedated  Paralyzed     SKIN:   Skin normal color for race,   Warm and dry   No evidence of rash.    PSYCHIATRIC:   Sedated   No apparent risk to self or others.    HEMATOLOGICAL:  No cervical  lymphadenopathy.  no inguinal lymphadenopathy      01-20-21 @ 07:01  -  01-21-21 @ 07:00  --------------------------------------------------------  IN:    Cisatracurium: 23.4 mL    Cisatracurium: 171.8 mL    Enteral Tube Flush: 200 mL    FentaNYL: 205.4 mL    IV PiggyBack: 300 mL    Midazolam: 48 mL    Norepinephrine: 6 mL    Peptamen Intense VHP: 920 mL    Sodium Chloride 0.9% Bolus: 500 mL  Total IN: 2374.6 mL    OUT:    Indwelling Catheter - Urethral (mL): 685 mL  Total OUT: 685 mL    Total NET: 1689.6 mL      01-21-21 @ 07:01  -  01-21-21 @ 09:07  --------------------------------------------------------  IN:    Cisatracurium: 19.6 mL    Enteral Tube Flush: 50 mL    FentaNYL: 19.6 mL    Midazolam: 4 mL    Peptamen Intense VHP: 80 mL  Total IN: 173.2 mL    OUT:    Indwelling Catheter - Urethral (mL): 25 mL    Norepinephrine: 0 mL  Total OUT: 25 mL    Total NET: 148.2 mL          LABS:                            10.2   5.67  )-----------( 294      ( 21 Jan 2021 04:50 )             32.5                                               01-21    136  |  107  |  81<HH>  ----------------------------<  186<H>  5.1<H>   |  21  |  2.0<H>    21 Jan 2021 04:50    136    |  107    |  81<HH>  ----------------------------<  186<H>  5.1<H>   |  21     |  2.0<H>  20 Jan 2021 15:39    138    |  107    |  72<HH>  ----------------------------<  212<H>  5.4<H>   |  21     |  1.8<H>    Ca    8.9        21 Jan 2021 04:50  Ca    8.9        20 Jan 2021 15:39  Phos  4.3       20 Jan 2021 05:40  Mg     2.7<H>     21 Jan 2021 04:50  Mg     2.6<H>     20 Jan 2021 05:40    TPro  5.2<L>  /  Alb  2.4<L>  /  TBili  <0.2   /  DBili  x      /  AST  42<H>  /  ALT  53<H>  /  AlkPhos  101    21 Jan 2021 04:50  TPro  5.2<L>  /  Alb  2.4<L>  /  TBili  <0.2   /  DBili  x      /  AST  35     /  ALT  27     /  AlkPhos  89     20 Jan 2021 05:40      Ca    8.9      21 Jan 2021 04:50  Phos  4.3     01-20  Mg     2.7     01-21    TPro  5.2<L>  /  Alb  2.4<L>  /  TBili  <0.2  /  DBili  x   /  AST  42<H>  /  ALT  53<H>  /  AlkPhos  101  01-21                                                                                           LIVER FUNCTIONS - ( 21 Jan 2021 04:50 )  Alb: 2.4 g/dL / Pro: 5.2 g/dL / ALK PHOS: 101 U/L / ALT: 53 U/L / AST: 42 U/L / GGT: x                                                                                               Mode: AC/ CMV (Assist Control/ Continuous Mandatory Ventilation)  RR (machine): 30  TV (machine): 330  FiO2: 100  PEEP: 17  ITime: 1  MAP: 26  PIP: 37                                      ABG - ( 21 Jan 2021 02:57 )  pH, Arterial: 7.22  pH, Blood: x     /  pCO2: 53    /  pO2: 71    / HCO3: 22    / Base Excess: -6.0  /  SaO2: 92    ppl 35              MEDICATIONS  (STANDING):  cefTRIAXone   IVPB 2000 milliGRAM(s) IV Intermittent every 24 hours  chlorhexidine 0.12% Liquid 15 milliLiter(s) Oral Mucosa two times a day  chlorhexidine 4% Liquid 1 Application(s) Topical <User Schedule>  cisatracurium Infusion 3 MICROgram(s)/kG/Min (11.7 mL/Hr) IV Continuous <Continuous>  dexAMETHasone  Injectable 6 milliGRAM(s) IV Push daily  fentaNYL    Injectable 25 MICROGram(s) IV Push once  fentaNYL   Infusion. 0.5 MICROgram(s)/kG/Hr (2.5 mL/Hr) IV Continuous <Continuous>  heparin   Injectable 5000 Unit(s) SubCutaneous every 8 hours  midazolam Infusion 0.02 mG/kG/Hr (1.3 mL/Hr) IV Continuous <Continuous>  norepinephrine Infusion 0.05 MICROgram(s)/kG/Min (4.69 mL/Hr) IV Continuous <Continuous>  pantoprazole   Suspension 40 milliGRAM(s) Oral daily  polyethylene glycol 3350 17 Gram(s) Oral daily  senna 2 Tablet(s) Oral at bedtime  sodium zirconium cyclosilicate 10 Gram(s) Oral every 8 hours    MEDICATIONS  (PRN):      New X-rays reviewed:                                                                                  ECHO    CXR interpreted by me:

## 2021-01-21 NOTE — PROVIDER CONTACT NOTE (EICU) - ACTION/TREATMENT ORDERED:
Ventilator ordered as  Vent set to 30/330/100/17  Vent order updated to reflect current settings.
MD ordered STAT x-ray. Cheetah test
Nimbex 10mg IV push. ABG ordered. Will continue to monitor and assess.

## 2021-01-21 NOTE — PROGRESS NOTE ADULT - SUBJECTIVE AND OBJECTIVE BOX
SUBJECTIVE:    Patient is a 75y old Female who presents with a chief complaint of hypoxia, AMS (21 Jan 2021 10:37)    Currently admitted to medicine with the primary diagnosis of Hypoxia       Today is hospital day 8d. No acute events overnight, ptnt w/ persistent decreased uop ~20-30/hr. No BM x 5 days.       PAST MEDICAL & SURGICAL HISTORY  Hypertension      SOCIAL HISTORY:  Negative for smoking/alcohol/drug use.     ALLERGIES:  No Known Allergies    MEDICATIONS:  STANDING MEDICATIONS  cefTRIAXone   IVPB 2000 milliGRAM(s) IV Intermittent every 24 hours  chlorhexidine 0.12% Liquid 15 milliLiter(s) Oral Mucosa two times a day  chlorhexidine 4% Liquid 1 Application(s) Topical <User Schedule>  dexAMETHasone  Injectable 6 milliGRAM(s) IV Push daily  fentaNYL    Injectable 25 MICROGram(s) IV Push once  fentaNYL   Infusion. 0.5 MICROgram(s)/kG/Hr IV Continuous <Continuous>  heparin   Injectable 5000 Unit(s) SubCutaneous every 8 hours  midazolam Infusion 0.02 mG/kG/Hr IV Continuous <Continuous>  norepinephrine Infusion 0.05 MICROgram(s)/kG/Min IV Continuous <Continuous>  pantoprazole   Suspension 40 milliGRAM(s) Oral daily  polyethylene glycol 3350 17 Gram(s) Oral daily  senna 2 Tablet(s) Oral at bedtime  sodium bicarbonate  Infusion 0.115 mEq/kG/Hr IV Continuous <Continuous>  sodium zirconium cyclosilicate 10 Gram(s) Oral every 8 hours    PRN MEDICATIONS    VITALS:   T(F): 97.6  HR: 84  BP: --  RR: 22  SpO2: 96%    PHYSICAL EXAM:  GEN: Intubated, sedated   LUNGS: Decreased breath sounds bilaterally   HEART: S1/S2 present.   ABD: Soft, non-distended.  EXT: No edema   NEURO: sedated, on paralytic, non focal     Barrett catheter present     LABS:                        10.2   5.67  )-----------( 294      ( 21 Jan 2021 04:50 )             32.5     01-21    136  |  107  |  81<HH>  ----------------------------<  186<H>  5.1<H>   |  21  |  2.0<H>    Ca    8.9      21 Jan 2021 04:50  Phos  4.3     01-20  Mg     2.7     01-21    TPro  5.2<L>  /  Alb  2.4<L>  /  TBili  <0.2  /  DBili  x   /  AST  42<H>  /  ALT  53<H>  /  AlkPhos  101  01-21        ABG - ( 21 Jan 2021 02:57 )  pH, Arterial: 7.22  pH, Blood: x     /  pCO2: 53    /  pO2: 71    / HCO3: 22    / Base Excess: -6.0  /  SaO2: 92          RADIOLOGY:  EXAM:  XR CHEST PORTABLE ROUTINE 1V          PROCEDURE DATE:  01/21/2021      INTERPRETATION:  Clinical History / Reason for exam: Line placement.    Comparison : Chest radiograph January 20, 2021.    Technique/Positioning: Single frontal chest x-ray obtained.    Findings:    Support devices: Stable endotracheal tube, enteric tube, right IJ central venous catheter.    Cardiac/mediastinum/hilum: Unchanged.    Lung parenchyma/Pleura: Stable bilateral opacities. No pneumothorax.    Skeleton/soft tissues: Unchanged.    Impression:  Stable bilateral opacities.  Stable support devices.    OSMAN SAAB M.D., ATTENDING RADIOLOGIST  This document has been electronically signed. Jan 21 2021  9:10AM

## 2021-01-21 NOTE — PROGRESS NOTE ADULT - ASSESSMENT
74 yo Female with PMHx of HTN, diagnosed with Covid about 19 days prior to presentation, presented to ED for hypoxia and AMS    # Acute hypoxic respiratory failure, possible CAP causing AMS  # Hx of COVID PNA Day 19  - Initially on Bipap and was intubated on 01/15 for distress  - C/w Rocephin   - C/w Decadron, day #9   - ID following, recs:   NO NEED TO REPEAT INFLAMMATORY MARKERS.   Deep ET cultures  rocephin 2 gm iv q24h  Dexamethasone 6 mg iv q24h for 10 days   Monitor for side effects: hyperglycemia, neurological ( agitation/confusion), adrenal suppression, bacterial and fungal infections    #Constipation  - likely secondary to fentanyl   - f/u KUB  - start on lactulose if negative   - monitor    #DWAYNE- worsening   - monitor creatinine   - was fluid responsive s/p fluid boluses  - Start on bicarb   - monitor UOP   - c/w subq heparin     #Hx HTN   - Holding Valsartan  - Holding Atenolol  - wean off levo   - monitor     Patient on Raloxifene, holding as would increase risk for VTE given her COVID status    Diet: NPO w/ tube feeds   DVT ppx: Heparin subq  GI ppx: Protonix 40 qd   Code full    76 yo Female with PMHx of HTN, diagnosed with Covid about 19 days prior to presentation, presented to ED for hypoxia and AMS    # Acute hypoxic respiratory failure, possible CAP causing AMS  # Hx of COVID PNA Day 19  - Initially on Bipap and was intubated on 01/15 for distress  - C/w Rocephin   - C/w Decadron, day #9   - ID following, recs:   NO NEED TO REPEAT INFLAMMATORY MARKERS.   Deep ET cultures  rocephin 2 gm iv q24h  Dexamethasone 6 mg iv q24h for 10 days   Monitor for side effects: hyperglycemia, neurological ( agitation/confusion), adrenal suppression, bacterial and fungal infections    #Constipation  - likely secondary to fentanyl   - f/u KUB  - start on lactulose if negative   - monitor    #DWAYNE- worsening   - monitor creatinine   - was fluid responsive s/p fluid boluses  - Start on bicarb   - monitor UOP   - c/w subq heparin     #Hx HTN   - Holding Valsartan  - Holding Atenolol  - wean off levo   - monitor     Patient on Raloxifene, holding as would increase risk for VTE given her COVID status.    Spoke with patient's , Bryn, [phone], updated him on patient's condition.     Diet: NPO w/ tube feeds   DVT ppx: Heparin subq  GI ppx: Protonix 40 qd   Code full

## 2021-01-21 NOTE — PROGRESS NOTE ADULT - ASSESSMENT
IMPRESSION:    Acute hypoxemic respiratory failure   severe COVID PNA  HTN  DWAYNE     PLAN:    CNS: Keep sedated.  DC paralysis.  CTH when more stable     HEENT: Oral care    PULMONARY:  HOB @ 45 degrees.  Monitor PPL and DP.  Wean o2 as tolerated.  .  RR 34.  PEEP 16     CARDIOVASCULAR: I = O. Avoid volume overload.  NaHCO3.      GI: GI prophylaxis.  OG feeding.  Bowel Regimen.  KUB     RENAL:  Follow up lytes.  Correct as needed.    INFECTIOUS DISEASE: Follow up cultures.  Repeat inflammatory markers, ABX Finish Course     HEMATOLOGICAL:  DVT prophylaxis. BID LMWH     ENDOCRINE:  Follow up FS.    MUSCULOSKELETAL: Bedrest    ICU monitoring    overall prognosis is extremely poor

## 2021-01-22 NOTE — PROGRESS NOTE ADULT - SUBJECTIVE AND OBJECTIVE BOX
SUBJECTIVE:    Patient is a 75y old Female who presents with a chief complaint of hypoxia, AMS (22 Jan 2021 08:40)    Currently admitted to medicine with the primary diagnosis of Hypoxia       Today is hospital day 9d. Patient was proned yesterday 3pm. Patient desatted yesterday and overnight.     PAST MEDICAL & SURGICAL HISTORY  Hypertension      SOCIAL HISTORY:  Negative for smoking/alcohol/drug use.     ALLERGIES:  No Known Allergies    MEDICATIONS:  STANDING MEDICATIONS  cefTRIAXone   IVPB 2000 milliGRAM(s) IV Intermittent every 24 hours  chlorhexidine 0.12% Liquid 15 milliLiter(s) Oral Mucosa two times a day  chlorhexidine 4% Liquid 1 Application(s) Topical <User Schedule>  cisatracurium Infusion 3 MICROgram(s)/kG/Min IV Continuous <Continuous>  dexAMETHasone  Injectable 6 milliGRAM(s) IV Push daily  fentaNYL    Injectable 25 MICROGram(s) IV Push once  fentaNYL   Infusion. 0.5 MICROgram(s)/kG/Hr IV Continuous <Continuous>  furosemide   Injectable 80 milliGRAM(s) IV Push every 12 hours  heparin   Injectable 5000 Unit(s) SubCutaneous every 8 hours  lactulose Syrup 20 Gram(s) Oral every 6 hours  midazolam Infusion 0.02 mG/kG/Hr IV Continuous <Continuous>  norepinephrine Infusion 0.05 MICROgram(s)/kG/Min IV Continuous <Continuous>  pantoprazole   Suspension 40 milliGRAM(s) Oral daily  polyethylene glycol 3350 17 Gram(s) Oral daily  senna 2 Tablet(s) Oral at bedtime  sodium zirconium cyclosilicate 10 Gram(s) Oral every 8 hours    PRN MEDICATIONS    VITALS:   T(F): 96.4  HR: 68  BP: --  RR: 29  SpO2: 86%    PHYSICAL EXAM:  GEN: Proned, Intubated, sedated  EXT: No edema   NEURO: sedated, on paralytic, non focal     Barrett catheter present       LABS:                        9.6    10.98 )-----------( 244      ( 22 Jan 2021 05:20 )             29.7     01-22    140  |  103  |  86<HH>  ----------------------------<  203<H>  4.4   |  28  |  1.9<H>    Ca    8.1<L>      22 Jan 2021 05:20  Mg     2.5     01-22    TPro  4.4<L>  /  Alb  2.0<L>  /  TBili  <0.2  /  DBili  x   /  AST  40  /  ALT  84<H>  /  AlkPhos  108  01-22    ABG - ( 22 Jan 2021 03:14 )  pH, Arterial: 7.29  pH, Blood: x     /  pCO2: 62    /  pO2: 51    / HCO3: 30    / Base Excess: 2.1   /  SaO2: 86        RADIOLOGY:  EXAM:  XR CHEST PORTABLE ROUTINE 1V          PROCEDURE DATE:  01/22/2021      INTERPRETATION:  Clinical History / Reason for exam: Respiratory failure    Comparison : Chest radiograph 1/21/2021.    Technique/Positioning: Single frontal chest radiograph.    Findings:    Support devices: Endotracheal tube in satisfactory position. Enteric tube coursing into the abdomen below the field-of-view. Right-sided central venous catheter tip overlying the SVC.    Cardiac/mediastinum/hilum: Obscured    Lung parenchyma/Pleura: Diffuse bilateral pulmonary opacities increased from prior. No pneumothorax.    Skeleton/soft tissues: Unchanged    Impression:    Diffuse bilateral pulmonary opacities increased from prior.    JAGDISH LYNN MD; Attending Radiologist  This document has been electronically signed. Jan 22 2021  9:46AM

## 2021-01-22 NOTE — PROGRESS NOTE ADULT - ASSESSMENT
IMPRESSION:    Acute hypoxemic respiratory failure   severe COVID PNA  HTN  DWAYNE     PLAN:    CNS: Keep sedated and paralysed.  CTH when more stable     HEENT: Oral care    PULMONARY:  HOB @ 45 degrees.  Monitor PPL and DP.  Wean o2 as tolerated.      CARDIOVASCULAR: I = O. Avoid volume overload.  DC NaHCO3. Lasix      GI: GI prophylaxis.  OG feeding when supine.  Bowel Regimen.      RENAL:  Follow up lytes.  Correct as needed.    INFECTIOUS DISEASE: Follow up cultures.  Repeat inflammatory markers, ABX Finish Course     HEMATOLOGICAL:  DVT prophylaxis. BID LMWH     ENDOCRINE:  Follow up FS.    MUSCULOSKELETAL: Bedrest    ICU monitoring    overall prognosis is extremely poor

## 2021-01-22 NOTE — PROGRESS NOTE ADULT - ASSESSMENT
74 yo Female with PMHx of HTN, diagnosed with Covid about 19 days prior to presentation, presented to ED for hypoxia and AMS    # Acute hypoxic respiratory failure, possible CAP causing AMS  # Hx of COVID PNA Day 19  - Initially on Bipap and was intubated on 01/15 for distress  - C/w Rocephin   - C/w Decadron, day #10  - prone positioning 16hrs, started 1/21 3pm, repeat ABG 2hrs after supine  - ID following, recs:   NO NEED TO REPEAT INFLAMMATORY MARKERS.   Deep ET cultures  rocephin 2 gm iv q24h  Dexamethasone 6 mg iv q24h for 10 days   Monitor for side effects: hyperglycemia, neurological ( agitation/confusion), adrenal suppression, bacterial and fungal infections    #Constipation  - likely secondary to fentanyl   - KUB 1/21 nonobstructive   - increase lactulose 20 q6h   - monitor    #DWAYNE- worsening   - monitor creatinine   - was fluid responsive s/p fluid boluses  - d/c sodium bicarb   - monitor UOP   - Start Lasix 80mg bid   - avoid nephrotoxic agents     #Hx HTN   - Holding Valsartan  - Holding Atenolol  - wean off levo   - monitor     Patient on Raloxifene, holding as would increase risk for VTE given her COVID status.    Diet: NPO w/ tube feeds, hold while in prone position   DVT ppx: Heparin subq  GI ppx: Protonix 40 qd   DNR     74 yo Female with PMHx of HTN, diagnosed with Covid about 19 days prior to presentation, presented to ED for hypoxia and AMS    # Acute hypoxic respiratory failure, possible CAP causing AMS  # Hx of COVID PNA Day 19  - Initially on Bipap and was intubated on 01/15 for distress  - C/w Rocephin   - C/w Decadron, day #10  - prone positioning 16hrs, started 1/21 3pm, repeat ABG 2hrs after supine  - ID following, recs:   NO NEED TO REPEAT INFLAMMATORY MARKERS.   Deep ET cultures  rocephin 2 gm iv q24h  Dexamethasone 6 mg iv q24h for 10 days   Monitor for side effects: hyperglycemia, neurological ( agitation/confusion), adrenal suppression, bacterial and fungal infections    #Constipation  - likely secondary to fentanyl   - KUB 1/21 nonobstructive   - increase lactulose 20 q6h   - monitor    #DWAYNE- worsening   - monitor creatinine   - was fluid responsive s/p fluid boluses  - d/c sodium bicarb   - monitor UOP   - Start Lasix 80mg bid   - avoid nephrotoxic agents     #Hx HTN   - Holding Valsartan  - Holding Atenolol  - wean off levo   - monitor     Patient on Raloxifene, holding as would increase risk for VTE given her COVID status.    Diet: NPO w/ tube feeds, hold while in prone position   DVT ppx: Heparin subq  GI ppx: Protonix 40 qd   DNR    Spoke with patient's , Bryn, [phone], updated him on patient's condition.  76 yo Female with PMHx of HTN, diagnosed with Covid about 19 days prior to presentation, presented to ED for hypoxia and AMS    # Acute hypoxic respiratory failure, possible CAP causing AMS  # Hx of COVID PNA Day 19  - Initially on Bipap and was intubated on 01/15 for distress  - C/w Rocephin   - C/w Decadron, day #10  - prone positioning 16hrs, started 1/21 3pm, repeat ABG 2hrs after supine  - ID following, recs:   NO NEED TO REPEAT INFLAMMATORY MARKERS.   Deep ET cultures  rocephin 2 gm iv q24h  Dexamethasone 6 mg iv q24h for 10 days   Monitor for side effects: hyperglycemia, neurological ( agitation/confusion), adrenal suppression, bacterial and fungal infections    #Constipation  - likely secondary to fentanyl   - KUB 1/21 nonobstructive   - increase lactulose 20 q6h   - monitor    #DWAYNE- worsening   - monitor creatinine   - was fluid responsive s/p fluid boluses  - d/c sodium bicarb   - monitor UOP   - Start Lasix 80mg bid   - avoid nephrotoxic agents     #Hx HTN   - Holding Valsartan  - Holding Atenolol  - wean off levo   - monitor     Patient on Raloxifene, holding as would increase risk for VTE given her COVID status.    Diet: NPO w/ tube feeds, hold while in prone position, increased feeds to 80/hr from 40/hr while ptnt in supine position (as not getting fed while prone) per Dr. Marshall palacios   DVT ppx: Heparin subq  GI ppx: Protonix 40 qd   DNR    Spoke with patient's , Bryn, [phone], updated him on patient's condition.

## 2021-01-22 NOTE — PROGRESS NOTE ADULT - SUBJECTIVE AND OBJECTIVE BOX
Patient is a 75y old  Female who presents with a chief complaint of hypoxia, AMS (21 Jan 2021 10:49)        Over Night Events:  Remains on MV.  Off pressors.  Sedated and paralyzed.  Proned with no significant improvement in PaO2         ROS:     All pertinent ROS are negative except HPI         PHYSICAL EXAM    ICU Vital Signs Last 24 Hrs  T(C): 35.8 (22 Jan 2021 08:00), Max: 36.2 (21 Jan 2021 12:00)  T(F): 96.4 (22 Jan 2021 08:00), Max: 97.2 (21 Jan 2021 12:00)  HR: 68 (22 Jan 2021 07:00) (62 - 98)  BP: --  BP(mean): --  ABP: 140/58 (22 Jan 2021 07:00) (108/50 - 194/70)  ABP(mean): 84 (22 Jan 2021 07:00) (68 - 104)  RR: 29 (22 Jan 2021 07:00) (22 - 34)  SpO2: 84% (22 Jan 2021 07:00) (68% - 96%)      CONSTITUTIONAL:   Ill appearing.  Well nourished.  NAD    ENT:   Airway patent,   Mouth with normal mucosa.   No thrush    EYES:   Pupils equal,   Round and reactive to light.    CARDIAC:   Normal rate,   Regular rhythm.     edema      Vascular:  Normal systolic impulse  No Carotid bruits    RESPIRATORY:   No wheezing  Bilateral BS  Normal chest expansion  Not tachypneic,  No use of accessory muscles    GASTROINTESTINAL:  Abdomen soft,   Non-tender,   No guarding,   + BS    GENITOURINARY  normal genitalia for sex  edema    MUSCULOSKELETAL:   Range of motion is not limited,  No clubbing, cyanosis    NEUROLOGICAL:   Sedated paralyzed     SKIN:   Skin normal color for race,   Warm and dry  No evidence of rash.    PSYCHIATRIC:   Sedated   No apparent risk to self or others.    HEMATOLOGICAL:  No cervical  lymphadenopathy.  no inguinal lymphadenopathy      01-21-21 @ 07:01  -  01-22-21 @ 07:00  --------------------------------------------------------  IN:    Cisatracurium: 19.6 mL    Cisatracurium: 197 mL    Enteral Tube Flush: 100 mL    FentaNYL: 212.2 mL    FentaNYL: 49.5 mL    IV PiggyBack: 1050 mL    Midazolam: 51 mL    Peptamen Intense VHP: 240 mL    Sodium Bicarbonate: 2000 mL  Total IN: 3919.3 mL    OUT:    Indwelling Catheter - Urethral (mL): 795 mL    Norepinephrine: 0 mL  Total OUT: 795 mL    Total NET: 3124.3 mL      01-22-21 @ 07:01  -  01-22-21 @ 08:41  --------------------------------------------------------  IN:    Cisatracurium: 15.6 mL    FentaNYL: 16.5 mL    Midazolam: 2 mL    Sodium Bicarbonate: 100 mL  Total IN: 134.1 mL    OUT:    Indwelling Catheter - Urethral (mL): 35 mL  Total OUT: 35 mL    Total NET: 99.1 mL          LABS:                            9.6    10.98 )-----------( 244      ( 22 Jan 2021 05:20 )             29.7                                               01-22    140  |  103  |  86<HH>  ----------------------------<  203<H>  4.4   |  28  |  1.9<H>    Creatinine Trend  BUN 86, Cr 1.9, (01-22-21 @ 05:20)  Creatinine Trend  BUN 81, Cr 2.0, (01-21-21 @ 04:50)  Creatinine Trend  BUN 72, Cr 1.8, (01-20-21 @ 15:39)  Creatinine Trend  BUN 67, Cr 2.0, (01-20-21 @ 05:40)  Creatinine Trend  BUN 62, Cr 2.0, (01-19-21 @ 05:35)  Creatinine Trend  BUN 50, Cr 1.3, (01-18-21 @ 04:50)  Creatinine Trend  BUN 49, Cr 1.3, (01-17-21 @ 16:29)      Ca    8.1<L>      22 Jan 2021 05:20  Mg     2.5     01-22    TPro  4.4<L>  /  Alb  2.0<L>  /  TBili  <0.2  /  DBili  x   /  AST  40  /  ALT  84<H>  /  AlkPhos  108  01-22                                                                                           LIVER FUNCTIONS - ( 22 Jan 2021 05:20 )  Alb: 2.0 g/dL / Pro: 4.4 g/dL / ALK PHOS: 108 U/L / ALT: 84 U/L / AST: 40 U/L / GGT: x                                                                                               Mode: AC/ CMV (Assist Control/ Continuous Mandatory Ventilation)  RR (machine): 30  TV (machine): 350  FiO2: 100  PEEP: 16  ITime: 1  MAP: 25  PIP: 39                                      ABG - ( 22 Jan 2021 03:14 )  pH, Arterial: 7.29  pH, Blood: x     /  pCO2: 62    /  pO2: 51    / HCO3: 30    / Base Excess: 2.1   /  SaO2: 86                  MEDICATIONS  (STANDING):  cefTRIAXone   IVPB 2000 milliGRAM(s) IV Intermittent every 24 hours  chlorhexidine 0.12% Liquid 15 milliLiter(s) Oral Mucosa two times a day  chlorhexidine 4% Liquid 1 Application(s) Topical <User Schedule>  cisatracurium Infusion 3 MICROgram(s)/kG/Min (11.7 mL/Hr) IV Continuous <Continuous>  dexAMETHasone  Injectable 6 milliGRAM(s) IV Push daily  fentaNYL    Injectable 25 MICROGram(s) IV Push once  fentaNYL   Infusion. 0.5 MICROgram(s)/kG/Hr (3.25 mL/Hr) IV Continuous <Continuous>  heparin   Injectable 5000 Unit(s) SubCutaneous every 8 hours  lactulose Syrup 10 Gram(s) Oral every 8 hours  midazolam Infusion 0.02 mG/kG/Hr (1.3 mL/Hr) IV Continuous <Continuous>  norepinephrine Infusion 0.05 MICROgram(s)/kG/Min (4.69 mL/Hr) IV Continuous <Continuous>  pantoprazole   Suspension 40 milliGRAM(s) Oral daily  polyethylene glycol 3350 17 Gram(s) Oral daily  senna 2 Tablet(s) Oral at bedtime  sodium bicarbonate  Infusion 0.231 mEq/kG/Hr (100 mL/Hr) IV Continuous <Continuous>  sodium zirconium cyclosilicate 10 Gram(s) Oral every 8 hours    MEDICATIONS  (PRN):      New X-rays reviewed:                                                                                  ECHO    CXR interpreted by me:  ET OG OK Bilateral infiltrates

## 2021-01-23 NOTE — PROGRESS NOTE ADULT - ASSESSMENT
· Assessment	  76 yo F, healthy other than hx of HTN, diagnosed with Covid about 19 days ago, presents to ED for hypoxia and AMS    IMPRESSION;  COVID 19 with critical illness. MV or end organ damage as seen in sepsis/septic shock.   ARDS with Fio2 100%  MSOF  renal failure  Pt is in the late inflammatory response phase ot the illness based on the onset of symptoms  procalcitonin 0.38    Ferritin 836  CRP 8.34>9.87  Ddimers 102>274>574  Blood & Urine cxs NGTD 1/14  Nares ORSA NG  legionella antigen NG    RECOMMENDATIONS;  NO NEED TO REPEAT INFLAMMATORY MARKERS.   Deep ET cultures  rocephin 2 gm iv q24h  Dexamethasone 6 mg iv q24h for 10 days   Monitor for side effects: hyperglycemia, neurological ( agitation/confusion), adrenal suppression, bacterial and fungal infections

## 2021-01-23 NOTE — PROGRESS NOTE ADULT - SUBJECTIVE AND OBJECTIVE BOX
Patient is a 75y old  Female who presents with a chief complaint of hypoxia, AMS (23 Jan 2021 08:33)      Over Night Events: none. remains intubated, on versed, fent, nimbex    I&O's Detail    22 Jan 2021 07:01  -  23 Jan 2021 07:00  --------------------------------------------------------  IN:    Cisatracurium: 354.9 mL    Enteral Tube Flush: 50 mL    FentaNYL: 396 mL    Midazolam: 38 mL    Midazolam: 10 mL    Peptamen Intense VHP: 1080 mL    Sodium Bicarbonate: 100 mL  Total IN: 2028.9 mL    OUT:    Indwelling Catheter - Urethral (mL): 2180 mL  Total OUT: 2180 mL    Total NET: -151.1 mL      23 Jan 2021 07:01  -  23 Jan 2021 10:49  --------------------------------------------------------  IN:    Cisatracurium: 62.4 mL    FentaNYL: 66 mL    Midazolam: 8 mL    Peptamen Intense VHP: 320 mL  Total IN: 456.4 mL    OUT:    Indwelling Catheter - Urethral (mL): 385 mL  Total OUT: 385 mL    Total NET: 71.4 mL          PHYSICAL EXAM    ICU Vital Signs Last 24 Hrs  T(C): 36.2 (23 Jan 2021 08:00), Max: 36.3 (23 Jan 2021 00:00)  T(F): 97.2 (23 Jan 2021 08:00), Max: 97.4 (23 Jan 2021 04:00)  HR: 98 (23 Jan 2021 10:00) (62 - 98)  BP: --  BP(mean): --  ABP: 128/50 (23 Jan 2021 10:00) (96/52 - 196/76)  ABP(mean): 70 (23 Jan 2021 10:00) (64 - 122)  RR: 30 (23 Jan 2021 10:00) (29 - 30)  SpO2: 94% (23 Jan 2021 08:00) (52% - 94%)      CONSTITUTIONAL:   Ill appearing.  Well nourished.  NAD    ENT:   Airway patent,   Mouth with normal mucosa.   No thrush    EYES:   Pupils equal,   Round and reactive to light.    CARDIAC:   Normal rate,   Regular rhythm.    No edema    Vascular:  Normal systolic impulse  No Carotid bruits    RESPIRATORY:   No wheezing  Bilateral BS  Normal chest expansion  Not tachypneic,  No use of accessory muscles    GASTROINTESTINAL:  Abdomen soft,   Non-tender,   No guarding,   + BS    GENITOURINARY  normal genitalia for sex  no edema    MUSCULOSKELETAL:   Range of motion is not limited,  No clubbing, cyanosis    NEUROLOGICAL:   sedated and parlayzed    SKIN:   Skin normal color for race,   Warm and dry  No evidence of rash.    PSYCHIATRIC:   Normal mood and affect.   No apparent risk to self or others.    HEMATOLOGICAL:  No cervical  lymphadenopathy.  no inguinal lymphadenopathy      LABS:                          10.0   12.34 )-----------( 226      ( 23 Jan 2021 04:30 )             32.0                                               01-23    139  |  99  |  95<HH>  ----------------------------<  158<H>  4.5   |  28  |  1.9<H>    Ca    8.5      23 Jan 2021 04:30  Mg     2.7     01-23    TPro  4.9<L>  /  Alb  2.2<L>  /  TBili  <0.2  /  DBili  x   /  AST  38  /  ALT  78<H>  /  AlkPhos  122<H>  01-23                                                                                           LIVER FUNCTIONS - ( 23 Jan 2021 04:30 )  Alb: 2.2 g/dL / Pro: 4.9 g/dL / ALK PHOS: 122 U/L / ALT: 78 U/L / AST: 38 U/L / GGT: x                                                                 D-Dimer Assay, Quantitative: 564 ng/mL DDU (01-21-21 @ 04:50)  D-Dimer Assay, Quantitative: 574 ng/mL DDU (01-20-21 @ 05:40)  D-Dimer Assay, Quantitative: 787 ng/mL DDU (01-19-21 @ 05:35)      Procalcitonin, Serum: 0.27 ng/mL (01-21-21 @ 04:50)  Procalcitonin, Serum: 0.21 ng/mL (01-20-21 @ 05:40)  Procalcitonin, Serum: 0.19 ng/mL (01-19-21 @ 05:35)          Serum Pro-Brain Natriuretic Peptide: 371 pg/mL (01-18-21 @ 12:40)                                            Mode: AC/ CMV (Assist Control/ Continuous Mandatory Ventilation)  RR (machine): 30  TV (machine): 350  FiO2: 100  PEEP: 16  ITime: 1  MAP: 27  PIP: 43                                      ABG - ( 23 Jan 2021 02:28 )  pH, Arterial: 7.35  pH, Blood: x     /  pCO2: 62    /  pO2: 43    / HCO3: 34    / Base Excess: 6.3   /  SaO2: 76        plat 39          MEDICATIONS  (STANDING):  cefTRIAXone   IVPB 2000 milliGRAM(s) IV Intermittent every 24 hours  chlorhexidine 0.12% Liquid 15 milliLiter(s) Oral Mucosa two times a day  chlorhexidine 4% Liquid 1 Application(s) Topical <User Schedule>  cisatracurium Infusion 3 MICROgram(s)/kG/Min (11.7 mL/Hr) IV Continuous <Continuous>  dexAMETHasone  Injectable 6 milliGRAM(s) IV Push daily  fentaNYL    Injectable 25 MICROGram(s) IV Push once  fentaNYL   Infusion. 0.5 MICROgram(s)/kG/Hr (3.25 mL/Hr) IV Continuous <Continuous>  furosemide   Injectable 80 milliGRAM(s) IV Push every 12 hours  heparin   Injectable 5000 Unit(s) SubCutaneous every 8 hours  lactulose Syrup 20 Gram(s) Oral every 6 hours  midazolam Infusion 0.02 mG/kG/Hr (1.3 mL/Hr) IV Continuous <Continuous>  norepinephrine Infusion 0.05 MICROgram(s)/kG/Min (4.69 mL/Hr) IV Continuous <Continuous>  pantoprazole   Suspension 40 milliGRAM(s) Oral daily  polyethylene glycol 3350 17 Gram(s) Oral daily  senna 2 Tablet(s) Oral at bedtime    MEDICATIONS  (PRN):      CXR interpreted by me:  lines and ett in place, bilaeral opac

## 2021-01-23 NOTE — PROGRESS NOTE ADULT - SUBJECTIVE AND OBJECTIVE BOX
ANTOINETTE FENG  75y, Female    All available historical data reviewed    OVERNIGHT EVENTS:  no fevers  fio2 100%  no pressors  non responsive    ROS:  unable to obtain history secondary to patient's mental status and/or sedation     VITALS:  T(F): 97.4, Max: 97.4 (01-23-21 @ 04:00)  HR: 86  BP: --  RR: 30Vital Signs Last 24 Hrs  T(C): 36.3 (23 Jan 2021 04:00), Max: 36.3 (23 Jan 2021 00:00)  T(F): 97.4 (23 Jan 2021 04:00), Max: 97.4 (23 Jan 2021 04:00)  HR: 86 (23 Jan 2021 07:00) (62 - 86)  BP: --  BP(mean): --  RR: 30 (23 Jan 2021 07:00) (29 - 30)  SpO2: 57% (23 Jan 2021 07:00) (57% - 94%)    TESTS & MEASUREMENTS:                        10.0   12.34 )-----------( 226      ( 23 Jan 2021 04:30 )             32.0     01-22    140  |  103  |  86<HH>  ----------------------------<  203<H>  4.4   |  28  |  1.9<H>    Ca    8.1<L>      22 Jan 2021 05:20  Mg     2.5     01-22    TPro  4.4<L>  /  Alb  2.0<L>  /  TBili  <0.2  /  DBili  x   /  AST  40  /  ALT  84<H>  /  AlkPhos  108  01-22    LIVER FUNCTIONS - ( 22 Jan 2021 05:20 )  Alb: 2.0 g/dL / Pro: 4.4 g/dL / ALK PHOS: 108 U/L / ALT: 84 U/L / AST: 40 U/L / GGT: x                   RADIOLOGY & ADDITIONAL TESTS:  Personal review of radiological diagnostics performed  Echo and EKG results noted when applicable.     MEDICATIONS:  cefTRIAXone   IVPB 2000 milliGRAM(s) IV Intermittent every 24 hours  chlorhexidine 0.12% Liquid 15 milliLiter(s) Oral Mucosa two times a day  chlorhexidine 4% Liquid 1 Application(s) Topical <User Schedule>  cisatracurium Infusion 3 MICROgram(s)/kG/Min IV Continuous <Continuous>  dexAMETHasone  Injectable 6 milliGRAM(s) IV Push daily  fentaNYL    Injectable 25 MICROGram(s) IV Push once  fentaNYL   Infusion. 0.5 MICROgram(s)/kG/Hr IV Continuous <Continuous>  furosemide   Injectable 80 milliGRAM(s) IV Push every 12 hours  heparin   Injectable 5000 Unit(s) SubCutaneous every 8 hours  lactulose Syrup 20 Gram(s) Oral every 6 hours  midazolam Infusion 0.02 mG/kG/Hr IV Continuous <Continuous>  norepinephrine Infusion 0.05 MICROgram(s)/kG/Min IV Continuous <Continuous>  pantoprazole   Suspension 40 milliGRAM(s) Oral daily  polyethylene glycol 3350 17 Gram(s) Oral daily  senna 2 Tablet(s) Oral at bedtime      ANTIBIOTICS:  cefTRIAXone   IVPB 2000 milliGRAM(s) IV Intermittent every 24 hours

## 2021-01-23 NOTE — PROGRESS NOTE ADULT - ATTENDING COMMENTS
Attending Statement: I have personally performed a face to face diagnostic evaluation on this patient. The patient is suffering from  respiratory failure from COVID -19. I have reviewed the above note and agree with the history, exam and suggestions for care, except as I have noted in the text.

## 2021-01-23 NOTE — PROGRESS NOTE ADULT - ASSESSMENT
IMPRESSION:    Acute hypoxemic respiratory failure   severe COVID PNA  HTN  DWAYNE     PLAN:    CNS: Keep sedated and paralysed.  CTH when more stable     HEENT: Oral care    PULMONARY:  HOB @ 45 degrees.  Monitor PPL and DP.  Wean o2 as tolerated.  proning today, decadron    CARDIOVASCULAR: I = O. Avoid volume overload.   Lasix      GI: GI prophylaxis.  OG feeding when supine.  Bowel Regimen.      RENAL:  Follow up lytes.  Correct as needed.    INFECTIOUS DISEASE: Follow up cultures.  Repeat inflammatory markers, ABX Finish Course     HEMATOLOGICAL:  DVT prophylaxis. BID LMWH     ENDOCRINE:  Follow up FS.    MUSCULOSKELETAL: Bedrest    ICU monitoring    overall prognosis is extremely poor         IMPRESSION:    Acute hypoxemic respiratory failure   severe COVID PNA  HTN  DWAYNE     PLAN:    CNS: Keep sedated and paralysed.  CTH when more stable     HEENT: Oral care    PULMONARY:  HOB @ 45 degrees.  Monitor PPL and DP.  Wean o2 as tolerated. peep 12, RR 36, decadron    CARDIOVASCULAR: I = O. Avoid volume overload.   Lasix      GI: GI prophylaxis.  OG feeding when supine.  Bowel Regimen.      RENAL:  Follow up lytes.  Correct as needed.    INFECTIOUS DISEASE: Follow up cultures.  Repeat inflammatory markers, ABX Finish Course     HEMATOLOGICAL:  DVT prophylaxis. BID LMWH     ENDOCRINE:  Follow up FS.    MUSCULOSKELETAL: Bedrest    ICU monitoring    overall prognosis is extremely poor

## 2021-01-24 NOTE — CHART NOTE - NSCHARTNOTEFT_GEN_A_CORE
This patient is supposed to be followed up by an RD on 1/23 however was noted in Progress note that nutrition/TF recs will be according to Dr. Olivares's recs (nutrition support team/ NST). Therefore, RD to sign off.       Per 1/24 progress note:  "Diet: NPO w/ tube feeds, hold while in prone position, increased feeds to 80/hr from 40/hr while ptnt in supine position (as not getting fed while prone) per Dr. Olivares recs"

## 2021-01-24 NOTE — PROGRESS NOTE ADULT - REASON FOR ADMISSION
hypoxia, AMS

## 2021-01-24 NOTE — PROGRESS NOTE ADULT - ASSESSMENT
IMPRESSION:    Acute hypoxemic respiratory failure   severe COVID PNA  HTN  DWAYNE     PLAN:    CNS: Keep sedated and paralysed.  CTH when more stable     HEENT: Oral care    PULMONARY:  HOB @ 45 degrees.  Monitor PPL and DP.  Wean o2 as tolerated. no vent setting changes    CARDIOVASCULAR: I = O. Avoid volume overload.   Lasix      GI: GI prophylaxis.  OG feeding when supine.  Bowel Regimen.      RENAL:  Follow up lytes.  Correct as needed.    INFECTIOUS DISEASE: Follow up cultures.  Repeat inflammatory markers, ABX Finish Course     HEMATOLOGICAL:  DVT prophylaxis. BID LMWH     ENDOCRINE:  Follow up FS.    MUSCULOSKELETAL: Bedrest    ICU monitoring    overall prognosis is extremely poor   IMPRESSION:    Acute hypoxemic respiratory failure   severe COVID PNA  HTN  DWAYNE     PLAN:    CNS: Keep sedated and paralysed.  CTH when more stable     HEENT: Oral care    PULMONARY:  HOB @ 45 degrees.  Monitor PPL and DP.  Wean o2 as tolerated. no vent setting changes    CARDIOVASCULAR: I = O. Avoid volume overload.   Lasix      GI: GI prophylaxis.  OG feeding when supine.  Bowel Regimen.      RENAL:  Follow up lytes.  Correct as needed.    INFECTIOUS DISEASE: Follow up cultures.  Repeat inflammatory markers, ABX Finish Course     HEMATOLOGICAL:  DVT prophylaxis. BID LMWH     ENDOCRINE:  Follow up FS.    MUSCULOSKELETAL: Bedrest    ICU monitoring    overall prognosis is extremely poor    The patient is critically ill with a high probability of imminent or life threatening deterioration.

## 2021-01-24 NOTE — PROGRESS NOTE ADULT - SUBJECTIVE AND OBJECTIVE BOX
SUBJECTIVE:    Patient is a 75y old Female who presents with a chief complaint of hypoxia, AMS (24 Jan 2021 09:42)    Currently admitted to medicine with the primary diagnosis of Hypoxia       Today is hospital day 11d. O2 sat critically low overnight and this AM.       PAST MEDICAL & SURGICAL HISTORY  Hypertension      SOCIAL HISTORY:  Negative for smoking/alcohol/drug use.     ALLERGIES:  No Known Allergies    MEDICATIONS:  STANDING MEDICATIONS  cefTRIAXone   IVPB 2000 milliGRAM(s) IV Intermittent every 24 hours  chlorhexidine 0.12% Liquid 15 milliLiter(s) Oral Mucosa two times a day  chlorhexidine 4% Liquid 1 Application(s) Topical <User Schedule>  cisatracurium Infusion 3 MICROgram(s)/kG/Min IV Continuous <Continuous>  dexAMETHasone  Injectable 6 milliGRAM(s) IV Push daily  fentaNYL    Injectable 25 MICROGram(s) IV Push once  fentaNYL   Infusion. 0.5 MICROgram(s)/kG/Hr IV Continuous <Continuous>  furosemide   Injectable 80 milliGRAM(s) IV Push every 12 hours  heparin   Injectable 5000 Unit(s) SubCutaneous every 8 hours  lactulose Syrup 20 Gram(s) Oral every 6 hours  midazolam Infusion 0.02 mG/kG/Hr IV Continuous <Continuous>  norepinephrine Infusion 0.05 MICROgram(s)/kG/Min IV Continuous <Continuous>  pantoprazole   Suspension 40 milliGRAM(s) Oral daily  polyethylene glycol 3350 17 Gram(s) Oral daily  senna 2 Tablet(s) Oral at bedtime    PRN MEDICATIONS    VITALS:   T(F): 97.6  HR: 92  BP: --  RR: 33  SpO2: 55%    LABS:                        10.0   19.09 )-----------( 255      ( 24 Jan 2021 04:30 )             32.1     01-24    138  |  95<L>  |  119<HH>  ----------------------------<  271<H>  5.3<H>   |  28  |  2.5<H>    Ca    8.4<L>      24 Jan 2021 04:30  Mg     2.9     01-24    TPro  5.2<L>  /  Alb  2.4<L>  /  TBili  <0.2  /  DBili  x   /  AST  157<H>  /  ALT  241<H>  /  AlkPhos  137<H>  01-24        ABG - ( 24 Jan 2021 02:56 )  pH, Arterial: 7.25  pH, Blood: x     /  pCO2: 71    /  pO2: 40    / HCO3: 31    / Base Excess: 2.4   /  SaO2: 64          RADIOLOGY:  EXAM:  XR CHEST PORTABLE ROUTINE 1V          PROCEDURE DATE:  01/24/2021      INTERPRETATION:  Clinical History / Reason for exam: Respiratory distress    Comparison : Chest radiograph 1/23/2021    Technique/Positioning: Frontal. Patient rotated to the left    Findings:    Support devices: Unchanged bilateral support catheters.    Cardiac/mediastinum/hilum: Unremarkable.    Lung parenchyma/Pleura: Bilateral opacities remain unchanged. No pleural effusion or air leak    Skeleton/soft tissues: Unremarkable.    Impression:    Bilateral opacities unchanged. No pleural effusion or air leak    DADA JUNG MD; Attending Radiologist  This document has been electronically signed. Jan 24 2021  8:05AM

## 2021-01-24 NOTE — PROGRESS NOTE ADULT - SUBJECTIVE AND OBJECTIVE BOX
Patient is a 75y old  Female who presents with a chief complaint of hypoxia, AMS (23 Jan 2021 10:48)        Over Night Events:  still with Severe hypoxia  pulse ox not detecting      ROS:     All ROS are negative except HPI         PHYSICAL EXAM    ICU Vital Signs Last 24 Hrs  T(C): 36.6 (24 Jan 2021 08:00), Max: 37.3 (23 Jan 2021 20:00)  T(F): 97.8 (24 Jan 2021 08:00), Max: 99.1 (23 Jan 2021 20:00)  HR: 88 (24 Jan 2021 09:30) (70 - 108)  BP: --  BP(mean): --  ABP: 88/46 (24 Jan 2021 09:30) (68/44 - 130/50)  ABP(mean): 60 (24 Jan 2021 09:30) (50 - 82)  RR: 30 (24 Jan 2021 09:30) (29 - 34)  SpO2: 55% (24 Jan 2021 07:48) (50% - 61%)      CONSTITUTIONAL:  Well nourished.  NAD    ENT:   Airway patent,   Mouth with normal mucosa.   No thrush    EYES:   Pupils equal,   Round and reactive to light.    CARDIAC:   Normal rate,   Regular rhythm.    No edema      Vascular:  Normal systolic impulse  No Carotid bruits    RESPIRATORY:   No wheezing  Bilateral BS  Normal chest expansion  Not tachypneic,  No use of accessory muscles    GASTROINTESTINAL:  Abdomen soft,   Non-tender,   No guarding,   + BS    MUSCULOSKELETAL:   Range of motion is not limited,  No clubbing, cyanosis    NEUROLOGICAL:   Alert and oriented   No motor  deficits.    SKIN:   Skin normal color for race,   Warm and dry and intact.   No evidence of rash.    PSYCHIATRIC:   Normal mood and affect.   No apparent risk to self or others.    HEMATOLOGICAL:  No cervical  lymphadenopathy.  no inguinal lymphadenopathy      01-23-21 @ 07:01  -  01-24-21 @ 07:00  --------------------------------------------------------  IN:    Cisatracurium: 308.1 mL    FentaNYL: 361 mL    Midazolam: 44 mL    Norepinephrine: 402.6 mL    Peptamen Intense VHP: 1760 mL  Total IN: 2875.7 mL    OUT:    Indwelling Catheter - Urethral (mL): 840 mL  Total OUT: 840 mL    Total NET: 2035.7 mL      01-24-21 @ 07:01  -  01-24-21 @ 09:43  --------------------------------------------------------  IN:    Cisatracurium: 23.4 mL    FentaNYL: 32.6 mL    Midazolam: 4 mL    Norepinephrine: 36.1 mL    Peptamen Intense VHP: 160 mL  Total IN: 256.1 mL    OUT:    Indwelling Catheter - Urethral (mL): 0 mL  Total OUT: 0 mL    Total NET: 256.1 mL          LABS:                            10.0   19.09 )-----------( 255      ( 24 Jan 2021 04:30 )             32.1                                               01-24    138  |  95<L>  |  119<HH>  ----------------------------<  271<H>  5.3<H>   |  28  |  2.5<H>    Ca    8.4<L>      24 Jan 2021 04:30  Mg     2.9     01-24    TPro  5.2<L>  /  Alb  2.4<L>  /  TBili  <0.2  /  DBili  x   /  AST  157<H>  /  ALT  241<H>  /  AlkPhos  137<H>  01-24                                                                                           LIVER FUNCTIONS - ( 24 Jan 2021 04:30 )  Alb: 2.4 g/dL / Pro: 5.2 g/dL / ALK PHOS: 137 U/L / ALT: 241 U/L / AST: 157 U/L / GGT: x                                                                                               Mode: AC/ CMV (Assist Control/ Continuous Mandatory Ventilation)  RR (machine): 30  TV (machine): 350  FiO2: 100  PEEP: 16  ITime: 1  MAP: 25  PIP: 39                                      ABG - ( 24 Jan 2021 02:56 )  pH, Arterial: 7.25  pH, Blood: x     /  pCO2: 71    /  pO2: 40    / HCO3: 31    / Base Excess: 2.4   /  SaO2: 64                  MEDICATIONS  (STANDING):  cefTRIAXone   IVPB 2000 milliGRAM(s) IV Intermittent every 24 hours  chlorhexidine 0.12% Liquid 15 milliLiter(s) Oral Mucosa two times a day  chlorhexidine 4% Liquid 1 Application(s) Topical <User Schedule>  cisatracurium Infusion 3 MICROgram(s)/kG/Min (11.7 mL/Hr) IV Continuous <Continuous>  dexAMETHasone  Injectable 6 milliGRAM(s) IV Push daily  fentaNYL    Injectable 25 MICROGram(s) IV Push once  fentaNYL   Infusion. 0.5 MICROgram(s)/kG/Hr (3.25 mL/Hr) IV Continuous <Continuous>  furosemide   Injectable 80 milliGRAM(s) IV Push every 12 hours  heparin   Injectable 5000 Unit(s) SubCutaneous every 8 hours  lactulose Syrup 20 Gram(s) Oral every 6 hours  midazolam Infusion 0.02 mG/kG/Hr (1.3 mL/Hr) IV Continuous <Continuous>  norepinephrine Infusion 0.05 MICROgram(s)/kG/Min (3.05 mL/Hr) IV Continuous <Continuous>  pantoprazole   Suspension 40 milliGRAM(s) Oral daily  polyethylene glycol 3350 17 Gram(s) Oral daily  senna 2 Tablet(s) Oral at bedtime    MEDICATIONS  (PRN):      New X-rays reviewed:                                                                                  ECHO    CXR interpreted by me:

## 2021-01-24 NOTE — DISCHARGE NOTE FOR THE EXPIRED PATIENT - HOSPITAL COURSE
HPI:  74 yo F, healthy other than hx of HTN, diagnosed with Covid about 19 days ago, presents to ED for assessment. Initially unclear why as patient denies dyspnea, however on further hx notes that her  stated she was acting confused. Patient was noted to have increased work of breathing and when she was placed on a monitor, found to be hypoxic to 68%, rapidly improved on NRB and then sustained comfortably on Hi-flow O2.   This history obtained from ED provider note, patient is AO3 on my encounter, she is having slurring of speech, she does not recall all events, she denied any chest pain, GI symptoms other than diarrhea, no urinary symptoms, no weakness or altered sensation. she could not recall details about being altered, I am unsure what is the patient baseline.  In the ED was HD stable, on  high flow, CXR showed questionable left sided opacity, was given 500c bolus, azithromycin and ceftriaxone  (13 Jan 2021 02:35)`    Hospital Course:   Pt's oxygen increased and pt went into respiartory failure. Intubated on 1/15, admitted to the ICU. Pt's clinical status rapidly deteriorated Vent setting maxed, pt put on pressors.    @21:20 on 1/24, received call that pt went into a pulseless rhythm.    HPI:  74 yo F, healthy other than hx of HTN, diagnosed with Covid about 19 days ago, presents to ED for assessment. Initially unclear why as patient denies dyspnea, however on further hx notes that her  stated she was acting confused. Patient was noted to have increased work of breathing and when she was placed on a monitor, found to be hypoxic to 68%, rapidly improved on NRB and then sustained comfortably on Hi-flow O2.   This history obtained from ED provider note, patient is AO3 on my encounter, she is having slurring of speech, she does not recall all events, she denied any chest pain, GI symptoms other than diarrhea, no urinary symptoms, no weakness or altered sensation. she could not recall details about being altered, I am unsure what is the patient baseline.  In the ED was HD stable, on  high flow, CXR showed questionable left sided opacity, was given 500c bolus, azithromycin and ceftriaxone  (13 Jan 2021 02:35)`    Hospital Course:   Pt's oxygen increased and pt went into respiartory failure. Intubated on 1/15, admitted to the ICU. Pt's clinical status rapidly deteriorated Vent setting maxed, pt put on pressors.    @21:20 on 1/24, received call that pt went into a pulseless rhythm. Pt was pronounced dead via cardiopulmonary criteria @ 21:22. , Bryn Farooq was called and notified.

## 2021-01-24 NOTE — CHART NOTE - NSCHARTNOTESELECT_GEN_ALL_CORE
Rapid Response
Attending Rounds/Event Note
Event Note
Transfer Note
f/u/Event Note

## 2021-01-24 NOTE — PROGRESS NOTE ADULT - ASSESSMENT
74 yo Female with PMHx of HTN, diagnosed with Covid about 19 days prior to presentation, presented to ED for hypoxia and AMS    # Acute hypoxic respiratory failure, possible CAP causing AMS  # Hx of COVID PNA Day 19  - Initially on Bipap and was intubated on 01/15 for distress  - C/w Rocephin   - s/p Decadron, 10 days   - prone positioning 16hrs, started 1/21 3pm, repeat ABG 2hrs after supine  - ID following, recs:   NO NEED TO REPEAT INFLAMMATORY MARKERS.   Deep ET cultures  rocephin 2 gm iv q24h  Dexamethasone 6 mg iv q24h for 10 days   Monitor for side effects: hyperglycemia, neurological ( agitation/confusion), adrenal suppression, bacterial and fungal infections    #Constipation  - likely secondary to fentanyl   - KUB 1/21 nonobstructive   - increase lactulose 20 q6h   - monitor    #DWAYNE- worsening   - monitor creatinine   - was fluid responsive s/p fluid boluses  - d/c sodium bicarb   - monitor UOP   - Start Lasix 80mg bid   - avoid nephrotoxic agents     #Hx HTN   - Holding Valsartan  - Holding Atenolol  - on levo, hypotensive   - monitor     Patient on Raloxifene, holding as would increase risk for VTE given her COVID status.    Diet: NPO w/ tube feeds, hold while in prone position, increased feeds to 80/hr from 40/hr while ptnt in supine position (as not getting fed while prone) per Dr. Marshall palacios   DVT ppx: Heparin subq  GI ppx: Protonix 40 qd   DNR

## 2021-02-01 DIAGNOSIS — K59.00 CONSTIPATION, UNSPECIFIED: ICD-10-CM

## 2021-02-01 DIAGNOSIS — U07.1 COVID-19: ICD-10-CM

## 2021-02-01 DIAGNOSIS — E87.0 HYPEROSMOLALITY AND HYPERNATREMIA: ICD-10-CM

## 2021-02-01 DIAGNOSIS — R65.21 SEVERE SEPSIS WITH SEPTIC SHOCK: ICD-10-CM

## 2021-02-01 DIAGNOSIS — Z66 DO NOT RESUSCITATE: ICD-10-CM

## 2021-02-01 DIAGNOSIS — G93.41 METABOLIC ENCEPHALOPATHY: ICD-10-CM

## 2021-02-01 DIAGNOSIS — Z87.891 PERSONAL HISTORY OF NICOTINE DEPENDENCE: ICD-10-CM

## 2021-02-01 DIAGNOSIS — J80 ACUTE RESPIRATORY DISTRESS SYNDROME: ICD-10-CM

## 2021-02-01 DIAGNOSIS — N17.9 ACUTE KIDNEY FAILURE, UNSPECIFIED: ICD-10-CM

## 2021-02-01 DIAGNOSIS — N18.30 CHRONIC KIDNEY DISEASE, STAGE 3 UNSPECIFIED: ICD-10-CM

## 2021-02-01 DIAGNOSIS — E87.2 ACIDOSIS: ICD-10-CM

## 2021-02-01 DIAGNOSIS — A41.9 SEPSIS, UNSPECIFIED ORGANISM: ICD-10-CM

## 2021-02-01 DIAGNOSIS — Z78.1 PHYSICAL RESTRAINT STATUS: ICD-10-CM

## 2021-02-01 DIAGNOSIS — J12.82 PNEUMONIA DUE TO CORONAVIRUS DISEASE 2019: ICD-10-CM

## 2021-02-01 DIAGNOSIS — I12.9 HYPERTENSIVE CHRONIC KIDNEY DISEASE WITH STAGE 1 THROUGH STAGE 4 CHRONIC KIDNEY DISEASE, OR UNSPECIFIED CHRONIC KIDNEY DISEASE: ICD-10-CM

## 2022-11-22 NOTE — ED PROVIDER NOTE - CHIEF COMPLAINT
Patient tolerated B12 injection without any complications. Patient verbalized understanding of discharge instructions. Ambulated off unit per self with belongings. The patient is a 75y Female complaining of

## 2023-10-26 NOTE — ED ADULT NURSE NOTE - DISCHARGE DATE/TIME
Attempted to contact Alessia Nleson to discuss  schedule CT .    Unable to leave message on phone - no voicemail or mailbox full on [phone] (home).    Magda Street LPN    
06-Jan-2021 21:28

## 2024-09-20 NOTE — ED PROVIDER NOTE - PATIENT PORTAL LINK FT
You can access the FollowMyHealth Patient Portal offered by [facility] by registering at the following website: [email or web address]. By joining [facility]’s FollowMyHealth portal, you will also be able to view your health information using other applications (apps) compatible with our system.
There are no Wet Read(s) to document.